# Patient Record
Sex: MALE | Race: WHITE | NOT HISPANIC OR LATINO | Employment: FULL TIME | URBAN - METROPOLITAN AREA
[De-identification: names, ages, dates, MRNs, and addresses within clinical notes are randomized per-mention and may not be internally consistent; named-entity substitution may affect disease eponyms.]

---

## 2023-04-19 LAB
ALANINE AMINOTRANSFERASE (SGPT) (U/L) IN SER/PLAS: 31 U/L (ref 10–52)
ALBUMIN (G/DL) IN SER/PLAS: 4.5 G/DL (ref 3.4–5)
ALKALINE PHOSPHATASE (U/L) IN SER/PLAS: 51 U/L (ref 33–120)
ANION GAP IN SER/PLAS: 10 MMOL/L (ref 10–20)
ASPARTATE AMINOTRANSFERASE (SGOT) (U/L) IN SER/PLAS: 17 U/L (ref 9–39)
BILIRUBIN TOTAL (MG/DL) IN SER/PLAS: 0.7 MG/DL (ref 0–1.2)
CALCIUM (MG/DL) IN SER/PLAS: 10.2 MG/DL (ref 8.6–10.3)
CARBON DIOXIDE, TOTAL (MMOL/L) IN SER/PLAS: 30 MMOL/L (ref 21–32)
CHLORIDE (MMOL/L) IN SER/PLAS: 104 MMOL/L (ref 98–107)
CHOLESTEROL (MG/DL) IN SER/PLAS: 137 MG/DL (ref 0–199)
CHOLESTEROL IN HDL (MG/DL) IN SER/PLAS: 58.2 MG/DL
CHOLESTEROL/HDL RATIO: 2.4
CREATININE (MG/DL) IN SER/PLAS: 1.17 MG/DL (ref 0.5–1.3)
GFR MALE: 73 ML/MIN/1.73M2
GLUCOSE (MG/DL) IN SER/PLAS: 99 MG/DL (ref 74–99)
LDL: 57 MG/DL (ref 0–99)
POTASSIUM (MMOL/L) IN SER/PLAS: 4.4 MMOL/L (ref 3.5–5.3)
PROSTATE SPECIFIC AG (NG/ML) IN SER/PLAS: 0.29 NG/ML (ref 0–4)
PROTEIN TOTAL: 7.3 G/DL (ref 6.4–8.2)
SODIUM (MMOL/L) IN SER/PLAS: 140 MMOL/L (ref 136–145)
TRIGLYCERIDE (MG/DL) IN SER/PLAS: 107 MG/DL (ref 0–149)
UREA NITROGEN (MG/DL) IN SER/PLAS: 18 MG/DL (ref 6–23)
VLDL: 21 MG/DL (ref 0–40)

## 2023-04-24 ENCOUNTER — OFFICE VISIT (OUTPATIENT)
Dept: PRIMARY CARE | Facility: CLINIC | Age: 58
End: 2023-04-24
Payer: COMMERCIAL

## 2023-04-24 VITALS
TEMPERATURE: 98.1 F | HEART RATE: 63 BPM | WEIGHT: 168 LBS | SYSTOLIC BLOOD PRESSURE: 146 MMHG | DIASTOLIC BLOOD PRESSURE: 92 MMHG | OXYGEN SATURATION: 100 %

## 2023-04-24 DIAGNOSIS — E78.2 MIXED HYPERLIPIDEMIA: Primary | ICD-10-CM

## 2023-04-24 DIAGNOSIS — Z72.0 TOBACCO ABUSE: ICD-10-CM

## 2023-04-24 DIAGNOSIS — J43.2 CENTRILOBULAR EMPHYSEMA (MULTI): ICD-10-CM

## 2023-04-24 DIAGNOSIS — I10 PRIMARY HYPERTENSION: ICD-10-CM

## 2023-04-24 PROCEDURE — 3077F SYST BP >= 140 MM HG: CPT | Performed by: FAMILY MEDICINE

## 2023-04-24 PROCEDURE — 99214 OFFICE O/P EST MOD 30 MIN: CPT | Performed by: FAMILY MEDICINE

## 2023-04-24 PROCEDURE — 3080F DIAST BP >= 90 MM HG: CPT | Performed by: FAMILY MEDICINE

## 2023-04-24 RX ORDER — CHLORTHALIDONE 25 MG/1
12.5 TABLET ORAL DAILY
COMMUNITY
End: 2023-04-24 | Stop reason: SDUPTHER

## 2023-04-24 RX ORDER — ATORVASTATIN CALCIUM 20 MG/1
20 TABLET, FILM COATED ORAL DAILY
COMMUNITY
Start: 2022-11-28 | End: 2023-04-24 | Stop reason: SDUPTHER

## 2023-04-24 RX ORDER — CHLORTHALIDONE 25 MG/1
12.5 TABLET ORAL DAILY
Qty: 90 TABLET | Refills: 1 | Status: SHIPPED | OUTPATIENT
Start: 2023-04-24 | End: 2023-07-17 | Stop reason: SDUPTHER

## 2023-04-24 RX ORDER — ATORVASTATIN CALCIUM 20 MG/1
20 TABLET, FILM COATED ORAL DAILY
Qty: 90 TABLET | Refills: 1 | Status: SHIPPED | OUTPATIENT
Start: 2023-04-24 | End: 2023-06-07 | Stop reason: SDUPTHER

## 2023-04-24 NOTE — PROGRESS NOTES
Subjective   Patient ID: Basim Wilkinson is a 57 y.o. male who presents for Hypertension (Recheck) and Hyperlipidemia (Review BW).  HPI    Current Outpatient Medications on File Prior to Visit   Medication Sig Dispense Refill    atorvastatin (Lipitor) 20 mg tablet Take 1 tablet (20 mg) by mouth once daily.      chlorthalidone (Hygroton) 25 mg tablet Take 0.5 tablets (12.5 mg) by mouth once daily.       No current facility-administered medications on file prior to visit.        Vitals:    04/24/23 1605   BP: (!) 146/92   Pulse: 63   Temp: 36.7 °C (98.1 °F)   SpO2: 100%       Review of Systems    Objective     Physical Exam    Orders Only on 04/19/2023   Component Date Value Ref Range Status    Cholesterol 04/19/2023 137  0 - 199 mg/dL Final    Comment: .      AGE      DESIRABLE   BORDERLINE HIGH   HIGH     0-19 Y     0 - 169       170 - 199     >/= 200    20-24 Y     0 - 189       190 - 224     >/= 225         >24 Y     0 - 199       200 - 239     >/= 240   **All ranges are based on fasting samples. Specific   therapeutic targets will vary based on patient-specific   cardiac risk.  .   Pediatric guidelines reference:Pediatrics 2011, 128(S5).   Adult guidelines reference: NCEP ATPIII Guidelines,     RIGOBERTO 2001, 258:2486-97  .   Venipuncture immediately after or during the    administration of Metamizole may lead to falsely   low results. Testing should be performed immediately   prior to Metamizole dosing.      HDL 04/19/2023 58.2  mg/dL Final    Comment: .      AGE      VERY LOW   LOW     NORMAL    HIGH       0-19 Y       < 35   < 40     40-45     ----    20-24 Y       ----   < 40       >45     ----      >24 Y       ----   < 40     40-60      >60  .      Cholesterol/HDL Ratio 04/19/2023 2.4   Final    Comment: REF VALUES  DESIRABLE  < 3.4  HIGH RISK  > 5.0      LDL 04/19/2023 57  0 - 99 mg/dL Final    Comment: .                           NEAR      BORD      AGE      DESIRABLE  OPTIMAL    HIGH     HIGH     VERY HIGH      0-19 Y     0 - 109     ---    110-129   >/= 130     ----    20-24 Y     0 - 119     ---    120-159   >/= 160     ----      >24 Y     0 -  99   100-129  130-159   160-189     >/=190  .      VLDL 04/19/2023 21  0 - 40 mg/dL Final    Triglycerides 04/19/2023 107  0 - 149 mg/dL Final    Comment: .      AGE      DESIRABLE   BORDERLINE HIGH   HIGH     VERY HIGH   0 D-90 D    19 - 174         ----         ----        ----  91 D- 9 Y     0 -  74        75 -  99     >/= 100      ----    10-19 Y     0 -  89        90 - 129     >/= 130      ----    20-24 Y     0 - 114       115 - 149     >/= 150      ----         >24 Y     0 - 149       150 - 199    200- 499    >/= 500  .   Venipuncture immediately after or during the    administration of Metamizole may lead to falsely   low results. Testing should be performed immediately   prior to Metamizole dosing.     Orders Only on 04/19/2023   Component Date Value Ref Range Status    Glucose 04/19/2023 99  74 - 99 mg/dL Final    Sodium 04/19/2023 140  136 - 145 mmol/L Final    Potassium 04/19/2023 4.4  3.5 - 5.3 mmol/L Final    Chloride 04/19/2023 104  98 - 107 mmol/L Final    Bicarbonate 04/19/2023 30  21 - 32 mmol/L Final    Anion Gap 04/19/2023 10  10 - 20 mmol/L Final    Urea Nitrogen 04/19/2023 18  6 - 23 mg/dL Final    Creatinine 04/19/2023 1.17  0.50 - 1.30 mg/dL Final    GFR MALE 04/19/2023 73  >90 mL/min/1.73m2 Final    Comment:  CALCULATIONS OF ESTIMATED GFR ARE PERFORMED   USING THE 2021 CKD-EPI STUDY REFIT EQUATION   WITHOUT THE RACE VARIABLE FOR THE IDMS-TRACEABLE   CREATININE METHODS.    https://jasn.asnjournals.org/content/early/2021/09/22/ASN.6872566508      Calcium 04/19/2023 10.2  8.6 - 10.3 mg/dL Final    Albumin 04/19/2023 4.5  3.4 - 5.0 g/dL Final    Alkaline Phosphatase 04/19/2023 51  33 - 120 U/L Final    Total Protein 04/19/2023 7.3  6.4 - 8.2 g/dL Final    AST 04/19/2023 17  9 - 39 U/L Final    Total Bilirubin 04/19/2023 0.7  0.0 - 1.2 mg/dL Final    ALT (SGPT)  04/19/2023 31  10 - 52 U/L Final    Comment:  Patients treated with Sulfasalazine may generate    falsely decreased results for ALT.     Orders Only on 04/19/2023   Component Date Value Ref Range Status    PSA 04/19/2023 0.29  0.00 - 4.00 ng/mL Final    Comment: The FDA requires that the method used for PSA assay be   reported to the physician. Values obtained with different   assay methods must not be used interchangeably. This test  was performed at [facility] using the Access   Hybritech PSA assay is a two-site immunoenzymatic sandwich   assay. The assay is approved for measurement of   prostate-specific antigen (PSA)in serum and may be used   in conjunction with a digital rectal examination in men   50 years and older as an aid in detection of prostate   cancer.  5-Alpha-reductase inhibitors (e.g. Proscar, Finasteride,   Avodart, Dutasteride and Dior) for the treatment of BPH   have been shown to lower PSA levels by an average of 50%   after 6 months of treatment.         Assessment/Plan   Problem List Items Addressed This Visit    None  Visit Diagnoses       Mixed hyperlipidemia    -  Primary    Relevant Medications    atorvastatin (Lipitor) 20 mg tablet    Other Relevant Orders    Comprehensive Metabolic Panel    Lipid Panel    CBC and Auto Differential    Primary hypertension        Relevant Medications    chlorthalidone (Hygroton) 25 mg tablet    Tobacco abuse        Centrilobular emphysema (CMS/HCC)            Encourage patient to quit smoking.  Talked him about his calcium scoring and lung cancer screening.  Follow-up in 6 months

## 2023-06-07 DIAGNOSIS — E78.2 MIXED HYPERLIPIDEMIA: ICD-10-CM

## 2023-06-07 RX ORDER — ATORVASTATIN CALCIUM 20 MG/1
20 TABLET, FILM COATED ORAL DAILY
Qty: 90 TABLET | Refills: 1 | Status: SHIPPED | OUTPATIENT
Start: 2023-06-07 | End: 2023-06-14 | Stop reason: SDUPTHER

## 2023-06-07 NOTE — TELEPHONE ENCOUNTER
Pt spouse called rx line yesterday @ 400 req refill on Atorvastatin needs sent to [facility]/ mk

## 2023-06-14 ENCOUNTER — TELEPHONE (OUTPATIENT)
Dept: PRIMARY CARE | Facility: CLINIC | Age: 58
End: 2023-06-14
Payer: COMMERCIAL

## 2023-06-14 DIAGNOSIS — E78.2 MIXED HYPERLIPIDEMIA: ICD-10-CM

## 2023-06-14 RX ORDER — ATORVASTATIN CALCIUM 20 MG/1
20 TABLET, FILM COATED ORAL DAILY
Qty: 90 TABLET | Refills: 1 | Status: SHIPPED | OUTPATIENT
Start: 2023-06-14 | End: 2023-10-25 | Stop reason: SDUPTHER

## 2023-07-17 DIAGNOSIS — I10 PRIMARY HYPERTENSION: ICD-10-CM

## 2023-07-17 RX ORDER — CHLORTHALIDONE 25 MG/1
12.5 TABLET ORAL DAILY
Qty: 90 TABLET | Refills: 1 | Status: SHIPPED | OUTPATIENT
Start: 2023-07-17 | End: 2023-10-25 | Stop reason: SDUPTHER

## 2023-07-17 NOTE — TELEPHONE ENCOUNTER
Pt called rx line @ 10:49am requesting RF on Chlorthalidone. [facility]    [facility] PT  Next OV 10/24/23  Med sent on 4/24/23 to [facility]  Pt uses [facility]  Ok to resend? Please advise./JW

## 2023-10-24 ENCOUNTER — APPOINTMENT (OUTPATIENT)
Dept: PRIMARY CARE | Facility: CLINIC | Age: 58
End: 2023-10-24
Payer: COMMERCIAL

## 2023-10-25 ENCOUNTER — OFFICE VISIT (OUTPATIENT)
Dept: PRIMARY CARE | Facility: CLINIC | Age: 58
End: 2023-10-25
Payer: COMMERCIAL

## 2023-10-25 VITALS
OXYGEN SATURATION: 97 % | DIASTOLIC BLOOD PRESSURE: 84 MMHG | TEMPERATURE: 97.8 F | HEART RATE: 95 BPM | HEIGHT: 67 IN | WEIGHT: 166 LBS | BODY MASS INDEX: 26.06 KG/M2 | SYSTOLIC BLOOD PRESSURE: 136 MMHG

## 2023-10-25 DIAGNOSIS — J43.2 CENTRILOBULAR EMPHYSEMA (MULTI): Primary | ICD-10-CM

## 2023-10-25 DIAGNOSIS — Z12.5 SCREENING FOR MALIGNANT NEOPLASM OF PROSTATE: ICD-10-CM

## 2023-10-25 DIAGNOSIS — E78.2 MIXED HYPERLIPIDEMIA: ICD-10-CM

## 2023-10-25 DIAGNOSIS — Z71.6 ENCOUNTER FOR TOBACCO USE CESSATION COUNSELING: ICD-10-CM

## 2023-10-25 DIAGNOSIS — Z00.00 ROUTINE ADULT HEALTH MAINTENANCE: ICD-10-CM

## 2023-10-25 DIAGNOSIS — I10 PRIMARY HYPERTENSION: ICD-10-CM

## 2023-10-25 PROCEDURE — 3075F SYST BP GE 130 - 139MM HG: CPT | Performed by: FAMILY MEDICINE

## 2023-10-25 PROCEDURE — 3079F DIAST BP 80-89 MM HG: CPT | Performed by: FAMILY MEDICINE

## 2023-10-25 PROCEDURE — 99396 PREV VISIT EST AGE 40-64: CPT | Performed by: FAMILY MEDICINE

## 2023-10-25 PROCEDURE — 99213 OFFICE O/P EST LOW 20 MIN: CPT | Performed by: FAMILY MEDICINE

## 2023-10-25 RX ORDER — ATORVASTATIN CALCIUM 20 MG/1
20 TABLET, FILM COATED ORAL DAILY
Qty: 90 TABLET | Refills: 1 | Status: SHIPPED | OUTPATIENT
Start: 2023-10-25 | End: 2024-04-17 | Stop reason: SDUPTHER

## 2023-10-25 RX ORDER — CHLORTHALIDONE 25 MG/1
12.5 TABLET ORAL DAILY
Qty: 90 TABLET | Refills: 1 | Status: SHIPPED | OUTPATIENT
Start: 2023-10-25 | End: 2024-02-19 | Stop reason: SDUPTHER

## 2023-10-25 ASSESSMENT — PATIENT HEALTH QUESTIONNAIRE - PHQ9
SUM OF ALL RESPONSES TO PHQ9 QUESTIONS 1 AND 2: 0
2. FEELING DOWN, DEPRESSED OR HOPELESS: NOT AT ALL
1. LITTLE INTEREST OR PLEASURE IN DOING THINGS: NOT AT ALL

## 2023-10-25 NOTE — PROGRESS NOTES
Subjective   Patient ID: Basim Wilkinson is a 57 y.o. male who presents for Annual Exam.  HPI    There is no problem list on file for this patient.      Social Connections: Not on file       Current Outpatient Medications on File Prior to Visit   Medication Sig Dispense Refill    atorvastatin (Lipitor) 20 mg tablet Take 1 tablet (20 mg) by mouth once daily. 90 tablet 1    chlorthalidone (Hygroton) 25 mg tablet Take 0.5 tablets (12.5 mg) by mouth once daily. 90 tablet 1     No current facility-administered medications on file prior to visit.        Vitals:    10/25/23 1343   BP: 136/84   Pulse: 95   Temp: 36.6 °C (97.8 °F)   SpO2: 97%     Vitals:    10/25/23 1343   Weight: 75.3 kg (166 lb)       Review of Systems   All other systems reviewed and are negative.      Objective     Physical Exam  Vitals reviewed.   Constitutional:       General: He is not in acute distress.     Appearance: Normal appearance. He is well-developed. He is not diaphoretic.   HENT:      Head: Normocephalic and atraumatic.      Right Ear: Tympanic membrane normal.      Left Ear: Tympanic membrane normal.      Nose: Nose normal.      Mouth/Throat:      Mouth: Mucous membranes are moist.   Eyes:      Pupils: Pupils are equal, round, and reactive to light.   Cardiovascular:      Rate and Rhythm: Normal rate and regular rhythm.      Heart sounds: Normal heart sounds. No murmur heard.     No friction rub. No gallop.   Pulmonary:      Effort: Pulmonary effort is normal.      Breath sounds: Normal breath sounds. No rales.   Abdominal:      General: Bowel sounds are normal.      Palpations: Abdomen is soft.      Tenderness: There is no abdominal tenderness.   Musculoskeletal:      Cervical back: Normal range of motion and neck supple.   Skin:     General: Skin is warm and dry.   Neurological:      Mental Status: He is alert.   Psychiatric:         Mood and Affect: Mood normal.         No visits with results within 2 Month(s) from this visit.   Latest  known visit with results is:   Orders Only on 04/19/2023   Component Date Value Ref Range Status    Cholesterol 04/19/2023 137  0 - 199 mg/dL Final    Comment: .      AGE      DESIRABLE   BORDERLINE HIGH   HIGH     0-19 Y     0 - 169       170 - 199     >/= 200    20-24 Y     0 - 189       190 - 224     >/= 225         >24 Y     0 - 199       200 - 239     >/= 240   **All ranges are based on fasting samples. Specific   therapeutic targets will vary based on patient-specific   cardiac risk.  .   Pediatric guidelines reference:Pediatrics 2011, 128(S5).   Adult guidelines reference: NCEP ATPIII Guidelines,     RIGOBERTO 2001, 258:9216-97  .   Venipuncture immediately after or during the    administration of Metamizole may lead to falsely   low results. Testing should be performed immediately   prior to Metamizole dosing.      HDL 04/19/2023 58.2  mg/dL Final    Comment: .      AGE      VERY LOW   LOW     NORMAL    HIGH       0-19 Y       < 35   < 40     40-45     ----    20-24 Y       ----   < 40       >45     ----      >24 Y       ----   < 40     40-60      >60  .      Cholesterol/HDL Ratio 04/19/2023 2.4   Final    Comment: REF VALUES  DESIRABLE  < 3.4  HIGH RISK  > 5.0      LDL 04/19/2023 57  0 - 99 mg/dL Final    Comment: .                           NEAR      BORD      AGE      DESIRABLE  OPTIMAL    HIGH     HIGH     VERY HIGH     0-19 Y     0 - 109     ---    110-129   >/= 130     ----    20-24 Y     0 - 119     ---    120-159   >/= 160     ----      >24 Y     0 -  99   100-129  130-159   160-189     >/=190  .      VLDL 04/19/2023 21  0 - 40 mg/dL Final    Triglycerides 04/19/2023 107  0 - 149 mg/dL Final    Comment: .      AGE      DESIRABLE   BORDERLINE HIGH   HIGH     VERY HIGH   0 D-90 D    19 - 174         ----         ----        ----  91 D- 9 Y     0 -  74        75 -  99     >/= 100      ----    10-19 Y     0 -  89        90 - 129     >/= 130      ----    20-24 Y     0 - 114       115 - 149     >/= 150       ----         >24 Y     0 - 149       150 - 199    200- 499    >/= 500  .   Venipuncture immediately after or during the    administration of Metamizole may lead to falsely   low results. Testing should be performed immediately   prior to Metamizole dosing.         Assessment/Plan   Problem List Items Addressed This Visit             ICD-10-CM    Centrilobular emphysema (CMS/HCC) - Primary J43.2     Other Visit Diagnoses         Codes    Mixed hyperlipidemia     E78.2    Primary hypertension     I10          Patient is doing well.  Refilled pts meds.  Follow up in 6 mo.  Due for scope in 2024.  Counseled on smoking cessation.

## 2023-11-07 ENCOUNTER — LAB (OUTPATIENT)
Dept: LAB | Facility: LAB | Age: 58
End: 2023-11-07
Payer: COMMERCIAL

## 2023-11-07 DIAGNOSIS — E78.2 MIXED HYPERLIPIDEMIA: ICD-10-CM

## 2023-11-07 DIAGNOSIS — Z12.5 SCREENING FOR MALIGNANT NEOPLASM OF PROSTATE: ICD-10-CM

## 2023-11-07 LAB
ALBUMIN SERPL BCP-MCNC: 4.8 G/DL (ref 3.4–5)
ALP SERPL-CCNC: 54 U/L (ref 33–120)
ALT SERPL W P-5'-P-CCNC: 38 U/L (ref 10–52)
ANION GAP SERPL CALC-SCNC: 13 MMOL/L (ref 10–20)
AST SERPL W P-5'-P-CCNC: 24 U/L (ref 9–39)
BASOPHILS # BLD AUTO: 0.17 X10*3/UL (ref 0–0.1)
BASOPHILS NFR BLD AUTO: 2.1 %
BILIRUB SERPL-MCNC: 1.1 MG/DL (ref 0–1.2)
BUN SERPL-MCNC: 13 MG/DL (ref 6–23)
CALCIUM SERPL-MCNC: 10.1 MG/DL (ref 8.6–10.3)
CHLORIDE SERPL-SCNC: 107 MMOL/L (ref 98–107)
CHOLEST SERPL-MCNC: 182 MG/DL (ref 0–199)
CHOLESTEROL/HDL RATIO: 2.7
CO2 SERPL-SCNC: 26 MMOL/L (ref 21–32)
CREAT SERPL-MCNC: 1.23 MG/DL (ref 0.5–1.3)
EOSINOPHIL # BLD AUTO: 0.2 X10*3/UL (ref 0–0.7)
EOSINOPHIL NFR BLD AUTO: 2.4 %
ERYTHROCYTE [DISTWIDTH] IN BLOOD BY AUTOMATED COUNT: 13.3 % (ref 11.5–14.5)
GFR SERPL CREATININE-BSD FRML MDRD: 68 ML/MIN/1.73M*2
GLUCOSE SERPL-MCNC: 103 MG/DL (ref 74–99)
HCT VFR BLD AUTO: 49.9 % (ref 41–52)
HDLC SERPL-MCNC: 68 MG/DL
HGB BLD-MCNC: 16.5 G/DL (ref 13.5–17.5)
IMM GRANULOCYTES # BLD AUTO: 0.06 X10*3/UL (ref 0–0.7)
IMM GRANULOCYTES NFR BLD AUTO: 0.7 % (ref 0–0.9)
LDLC SERPL CALC-MCNC: 91 MG/DL
LYMPHOCYTES # BLD AUTO: 2.74 X10*3/UL (ref 1.2–4.8)
LYMPHOCYTES NFR BLD AUTO: 33.1 %
MCH RBC QN AUTO: 31.3 PG (ref 26–34)
MCHC RBC AUTO-ENTMCNC: 33.1 G/DL (ref 32–36)
MCV RBC AUTO: 95 FL (ref 80–100)
MONOCYTES # BLD AUTO: 0.65 X10*3/UL (ref 0.1–1)
MONOCYTES NFR BLD AUTO: 7.9 %
NEUTROPHILS # BLD AUTO: 4.45 X10*3/UL (ref 1.2–7.7)
NEUTROPHILS NFR BLD AUTO: 53.8 %
NON HDL CHOLESTEROL: 114 MG/DL (ref 0–149)
NRBC BLD-RTO: 0 /100 WBCS (ref 0–0)
PLATELET # BLD AUTO: 309 X10*3/UL (ref 150–450)
POTASSIUM SERPL-SCNC: 4.9 MMOL/L (ref 3.5–5.3)
PROT SERPL-MCNC: 7.8 G/DL (ref 6.4–8.2)
PSA SERPL-MCNC: 0.53 NG/ML
RBC # BLD AUTO: 5.27 X10*6/UL (ref 4.5–5.9)
SODIUM SERPL-SCNC: 141 MMOL/L (ref 136–145)
TRIGL SERPL-MCNC: 117 MG/DL (ref 0–149)
VLDL: 23 MG/DL (ref 0–40)
WBC # BLD AUTO: 8.3 X10*3/UL (ref 4.4–11.3)

## 2023-11-07 PROCEDURE — 36415 COLL VENOUS BLD VENIPUNCTURE: CPT

## 2023-11-07 PROCEDURE — 80053 COMPREHEN METABOLIC PANEL: CPT

## 2023-11-07 PROCEDURE — 80061 LIPID PANEL: CPT

## 2023-11-07 PROCEDURE — 85025 COMPLETE CBC W/AUTO DIFF WBC: CPT

## 2023-11-07 PROCEDURE — 84153 ASSAY OF PSA TOTAL: CPT

## 2023-11-08 ENCOUNTER — TELEPHONE (OUTPATIENT)
Dept: PRIMARY CARE | Facility: CLINIC | Age: 58
End: 2023-11-08
Payer: COMMERCIAL

## 2023-11-08 NOTE — RESULT ENCOUNTER NOTE
BW looks good- except BS which is slightly high and kidney function which is slightly worse than average.  Looks like its been stable however.

## 2023-11-08 NOTE — TELEPHONE ENCOUNTER
----- Message from Sunny Martin MD sent at 11/7/2023  9:24 PM EST -----  BW looks good- except BS which is slightly high and kidney function which is slightly worse than average.  Looks like its been stable however.

## 2024-02-19 DIAGNOSIS — I10 PRIMARY HYPERTENSION: ICD-10-CM

## 2024-02-19 RX ORDER — CHLORTHALIDONE 25 MG/1
12.5 TABLET ORAL DAILY
Qty: 90 TABLET | Refills: 1 | Status: SHIPPED | OUTPATIENT
Start: 2024-02-19 | End: 2024-04-17 | Stop reason: SDUPTHER

## 2024-02-19 NOTE — TELEPHONE ENCOUNTER
Pt called rx line @ 10:51am requesting RF on Chlorthalidone. Please call pt once sent. [facility].    Next OV 4/25/24  Pt compliant  Please advise. Thanks. JW

## 2024-04-17 ENCOUNTER — OFFICE VISIT (OUTPATIENT)
Dept: PRIMARY CARE | Facility: CLINIC | Age: 59
End: 2024-04-17
Payer: COMMERCIAL

## 2024-04-17 VITALS
HEART RATE: 112 BPM | DIASTOLIC BLOOD PRESSURE: 90 MMHG | WEIGHT: 172 LBS | SYSTOLIC BLOOD PRESSURE: 140 MMHG | TEMPERATURE: 98.3 F | OXYGEN SATURATION: 97 % | BODY MASS INDEX: 26.94 KG/M2

## 2024-04-17 DIAGNOSIS — J43.2 CENTRILOBULAR EMPHYSEMA (MULTI): ICD-10-CM

## 2024-04-17 DIAGNOSIS — E78.2 MIXED HYPERLIPIDEMIA: ICD-10-CM

## 2024-04-17 DIAGNOSIS — Z12.11 SCREENING FOR MALIGNANT NEOPLASM OF COLON: Primary | ICD-10-CM

## 2024-04-17 DIAGNOSIS — Z72.0 TOBACCO ABUSE: ICD-10-CM

## 2024-04-17 DIAGNOSIS — I10 PRIMARY HYPERTENSION: ICD-10-CM

## 2024-04-17 PROCEDURE — 99214 OFFICE O/P EST MOD 30 MIN: CPT | Performed by: FAMILY MEDICINE

## 2024-04-17 PROCEDURE — 3080F DIAST BP >= 90 MM HG: CPT | Performed by: FAMILY MEDICINE

## 2024-04-17 PROCEDURE — 3077F SYST BP >= 140 MM HG: CPT | Performed by: FAMILY MEDICINE

## 2024-04-17 RX ORDER — VALSARTAN 160 MG/1
160 TABLET ORAL DAILY
Qty: 90 TABLET | Refills: 1 | Status: SHIPPED | OUTPATIENT
Start: 2024-04-17 | End: 2024-10-14

## 2024-04-17 RX ORDER — CHLORTHALIDONE 25 MG/1
12.5 TABLET ORAL DAILY
Qty: 90 TABLET | Refills: 1 | Status: SHIPPED | OUTPATIENT
Start: 2024-04-17

## 2024-04-17 RX ORDER — ATORVASTATIN CALCIUM 20 MG/1
20 TABLET, FILM COATED ORAL DAILY
Qty: 90 TABLET | Refills: 1 | Status: SHIPPED | OUTPATIENT
Start: 2024-04-17

## 2024-04-17 NOTE — PROGRESS NOTES
Subjective   Patient ID: Basim Wilkinson is a 58 y.o. male who presents for centrilobular emphysema and Hyperlipidemia (recheck).  HPI    HTN:  running high at home    2. CAD:  CA score 150 in 2022.    3. Lipids: taking lipitor    4.  Emphysema:  Doesn't feel SOB.  Not helped by inhaler.    Patient Active Problem List   Diagnosis    Centrilobular emphysema (Multi)       Social Connections: Not on file       Current Outpatient Medications on File Prior to Visit   Medication Sig Dispense Refill    [DISCONTINUED] atorvastatin (Lipitor) 20 mg tablet Take 1 tablet (20 mg) by mouth once daily. 90 tablet 1    [DISCONTINUED] chlorthalidone (Hygroton) 25 mg tablet Take 0.5 tablets (12.5 mg) by mouth once daily. 90 tablet 1     No current facility-administered medications on file prior to visit.        Vitals:    04/17/24 1421   BP: 140/90   Pulse: (!) 112   Temp: 36.8 °C (98.3 °F)   SpO2: 97%     Vitals:    04/17/24 1421   Weight: 78 kg (172 lb)       Review of Systems   All other systems reviewed and are negative.      Objective     Physical Exam  Vitals reviewed.   Constitutional:       General: He is not in acute distress.     Appearance: Normal appearance. He is well-developed. He is not diaphoretic.   HENT:      Head: Normocephalic and atraumatic.      Right Ear: Tympanic membrane normal.      Left Ear: Tympanic membrane normal.      Nose: Nose normal.      Mouth/Throat:      Mouth: Mucous membranes are moist.   Eyes:      Pupils: Pupils are equal, round, and reactive to light.   Cardiovascular:      Rate and Rhythm: Normal rate and regular rhythm.      Heart sounds: Normal heart sounds. No murmur heard.     No friction rub. No gallop.   Pulmonary:      Effort: Pulmonary effort is normal.      Breath sounds: Normal breath sounds. No rales.   Abdominal:      General: Bowel sounds are normal.      Palpations: Abdomen is soft.      Tenderness: There is no abdominal tenderness.   Musculoskeletal:      Cervical back: Normal  range of motion and neck supple.   Skin:     General: Skin is warm and dry.   Neurological:      Mental Status: He is alert.   Psychiatric:         Mood and Affect: Mood normal.         No visits with results within 2 Month(s) from this visit.   Latest known visit with results is:   Lab on 11/07/2023   Component Date Value Ref Range Status    WBC 11/07/2023 8.3  4.4 - 11.3 x10*3/uL Final    nRBC 11/07/2023 0.0  0.0 - 0.0 /100 WBCs Final    RBC 11/07/2023 5.27  4.50 - 5.90 x10*6/uL Final    Hemoglobin 11/07/2023 16.5  13.5 - 17.5 g/dL Final    Hematocrit 11/07/2023 49.9  41.0 - 52.0 % Final    MCV 11/07/2023 95  80 - 100 fL Final    MCH 11/07/2023 31.3  26.0 - 34.0 pg Final    MCHC 11/07/2023 33.1  32.0 - 36.0 g/dL Final    RDW 11/07/2023 13.3  11.5 - 14.5 % Final    Platelets 11/07/2023 309  150 - 450 x10*3/uL Final    Neutrophils % 11/07/2023 53.8  40.0 - 80.0 % Final    Immature Granulocytes %, Automated 11/07/2023 0.7  0.0 - 0.9 % Final    Immature Granulocyte Count (IG) includes promyelocytes, myelocytes and metamyelocytes but does not include bands. Percent differential counts (%) should be interpreted in the context of the absolute cell counts (cells/UL).    Lymphocytes % 11/07/2023 33.1  13.0 - 44.0 % Final    Monocytes % 11/07/2023 7.9  2.0 - 10.0 % Final    Eosinophils % 11/07/2023 2.4  0.0 - 6.0 % Final    Basophils % 11/07/2023 2.1  0.0 - 2.0 % Final    Neutrophils Absolute 11/07/2023 4.45  1.20 - 7.70 x10*3/uL Final    Percent differential counts (%) should be interpreted in the context of the absolute cell counts (cells/uL).    Immature Granulocytes Absolute, Au* 11/07/2023 0.06  0.00 - 0.70 x10*3/uL Final    Lymphocytes Absolute 11/07/2023 2.74  1.20 - 4.80 x10*3/uL Final    Monocytes Absolute 11/07/2023 0.65  0.10 - 1.00 x10*3/uL Final    Eosinophils Absolute 11/07/2023 0.20  0.00 - 0.70 x10*3/uL Final    Basophils Absolute 11/07/2023 0.17 (H)  0.00 - 0.10 x10*3/uL Final    Glucose 11/07/2023 103 (H)   74 - 99 mg/dL Final    Sodium 11/07/2023 141  136 - 145 mmol/L Final    Potassium 11/07/2023 4.9  3.5 - 5.3 mmol/L Final    Chloride 11/07/2023 107  98 - 107 mmol/L Final    Bicarbonate 11/07/2023 26  21 - 32 mmol/L Final    Anion Gap 11/07/2023 13  10 - 20 mmol/L Final    Urea Nitrogen 11/07/2023 13  6 - 23 mg/dL Final    Creatinine 11/07/2023 1.23  0.50 - 1.30 mg/dL Final    eGFR 11/07/2023 68  >60 mL/min/1.73m*2 Final    Calculations of estimated GFR are performed using the 2021 CKD-EPI Study Refit equation without the race variable for the IDMS-Traceable creatinine methods.  https://jasn.asnjournals.org/content/early/2021/09/22/ASN.5366503939    Calcium 11/07/2023 10.1  8.6 - 10.3 mg/dL Final    Albumin 11/07/2023 4.8  3.4 - 5.0 g/dL Final    Alkaline Phosphatase 11/07/2023 54  33 - 120 U/L Final    Total Protein 11/07/2023 7.8  6.4 - 8.2 g/dL Final    AST 11/07/2023 24  9 - 39 U/L Final    Bilirubin, Total 11/07/2023 1.1  0.0 - 1.2 mg/dL Final    ALT 11/07/2023 38  10 - 52 U/L Final    Patients treated with Sulfasalazine may generate falsely decreased results for ALT.    Cholesterol 11/07/2023 182  0 - 199 mg/dL Final          Age      Desirable   Borderline High   High     0-19 Y     0 - 169       170 - 199     >/= 200    20-24 Y     0 - 189       190 - 224     >/= 225         >24 Y     0 - 199       200 - 239     >/= 240   **All ranges are based on fasting samples. Specific   therapeutic targets will vary based on patient-specific   cardiac risk.    Pediatric guidelines reference:Pediatrics 2011, 128(S5).Adult guidelines reference: NCEP ATPIII Guidelines,RIGOBERTO 2001, 258:2486-97    Venipuncture immediately after or during the administration of Metamizole may lead to falsely low results. Testing should be performed immediately prior to Metamizole dosing.    HDL-Cholesterol 11/07/2023 68.0  mg/dL Final      Age       Very Low   Low     Normal    High    0-19 Y    < 35      < 40     40-45     ----  20-24 Y    ----      < 40      >45      ----        >24 Y      ----     < 40     40-60      >60      Cholesterol/HDL Ratio 11/07/2023 2.7   Final      Ref Values  Desirable  < 3.4  High Risk  > 5.0    LDL Calculated 11/07/2023 91  <=99 mg/dL Final                                Near   Borderline      AGE      Desirable  Optimal    High     High     Very High     0-19 Y     0 - 109     ---    110-129   >/= 130     ----    20-24 Y     0 - 119     ---    120-159   >/= 160     ----      >24 Y     0 -  99   100-129  130-159   160-189     >/=190      VLDL 11/07/2023 23  0 - 40 mg/dL Final    Triglycerides 11/07/2023 117  0 - 149 mg/dL Final       Age         Desirable   Borderline High   High     Very High   0 D-90 D    19 - 174         ----         ----        ----  91 D- 9 Y     0 -  74        75 -  99     >/= 100      ----    10-19 Y     0 -  89        90 - 129     >/= 130      ----    20-24 Y     0 - 114       115 - 149     >/= 150      ----         >24 Y     0 - 149       150 - 199    200- 499    >/= 500    Venipuncture immediately after or during the administration of Metamizole may lead to falsely low results. Testing should be performed immediately prior to Metamizole dosing.    Non HDL Cholesterol 11/07/2023 114  0 - 149 mg/dL Final          Age       Desirable   Borderline High   High     Very High     0-19 Y     0 - 119       120 - 144     >/= 145    >/= 160    20-24 Y     0 - 149       150 - 189     >/= 190      ----         >24 Y    30 mg/dL above LDL Cholesterol goal      Prostate Specific AG 11/07/2023 0.53  <=4.00 ng/mL Final       Assessment/Plan   Problem List Items Addressed This Visit             ICD-10-CM    Centrilobular emphysema (Multi) J43.2     Other Visit Diagnoses         Codes    Screening for malignant neoplasm of colon    -  Primary Z12.11    Relevant Orders    Colonoscopy Screening; Average Risk Patient    Mixed hyperlipidemia     E78.2    Relevant Medications    atorvastatin (Lipitor) 20 mg tablet    Other  Relevant Orders    Lipid Panel    Comprehensive Metabolic Panel    CBC and Auto Differential    Primary hypertension     I10    Relevant Medications    chlorthalidone (Hygroton) 25 mg tablet    valsartan (Diovan) 160 mg tablet    Tobacco abuse     Z72.0    Relevant Orders    CT lung screening low dose          Patient is doing well.  Refilled pts meds.  Follow up in 6 mo.  Adding on Valsartan.  Counseled on smoking cessation.

## 2024-04-23 ENCOUNTER — LAB (OUTPATIENT)
Dept: LAB | Facility: LAB | Age: 59
End: 2024-04-23
Payer: COMMERCIAL

## 2024-04-23 DIAGNOSIS — E78.2 MIXED HYPERLIPIDEMIA: ICD-10-CM

## 2024-04-23 LAB
ALBUMIN SERPL BCP-MCNC: 4.3 G/DL (ref 3.4–5)
ALP SERPL-CCNC: 52 U/L (ref 33–120)
ALT SERPL W P-5'-P-CCNC: 30 U/L (ref 10–52)
ANION GAP SERPL CALC-SCNC: 8 MMOL/L (ref 10–20)
AST SERPL W P-5'-P-CCNC: 17 U/L (ref 9–39)
BASOPHILS # BLD AUTO: 0.18 X10*3/UL (ref 0–0.1)
BASOPHILS NFR BLD AUTO: 1.9 %
BILIRUB SERPL-MCNC: 0.7 MG/DL (ref 0–1.2)
BUN SERPL-MCNC: 15 MG/DL (ref 6–23)
CALCIUM SERPL-MCNC: 9.7 MG/DL (ref 8.6–10.3)
CHLORIDE SERPL-SCNC: 105 MMOL/L (ref 98–107)
CHOLEST SERPL-MCNC: 136 MG/DL (ref 0–199)
CHOLESTEROL/HDL RATIO: 2.6
CO2 SERPL-SCNC: 30 MMOL/L (ref 21–32)
CREAT SERPL-MCNC: 1.14 MG/DL (ref 0.5–1.3)
EGFRCR SERPLBLD CKD-EPI 2021: 75 ML/MIN/1.73M*2
EOSINOPHIL # BLD AUTO: 0.24 X10*3/UL (ref 0–0.7)
EOSINOPHIL NFR BLD AUTO: 2.5 %
ERYTHROCYTE [DISTWIDTH] IN BLOOD BY AUTOMATED COUNT: 13.1 % (ref 11.5–14.5)
GLUCOSE SERPL-MCNC: 97 MG/DL (ref 74–99)
HCT VFR BLD AUTO: 48.3 % (ref 41–52)
HDLC SERPL-MCNC: 53.3 MG/DL
HGB BLD-MCNC: 16.5 G/DL (ref 13.5–17.5)
IMM GRANULOCYTES # BLD AUTO: 0.09 X10*3/UL (ref 0–0.7)
IMM GRANULOCYTES NFR BLD AUTO: 0.9 % (ref 0–0.9)
LDLC SERPL CALC-MCNC: 60 MG/DL
LYMPHOCYTES # BLD AUTO: 2.73 X10*3/UL (ref 1.2–4.8)
LYMPHOCYTES NFR BLD AUTO: 28.2 %
MCH RBC QN AUTO: 31.6 PG (ref 26–34)
MCHC RBC AUTO-ENTMCNC: 34.2 G/DL (ref 32–36)
MCV RBC AUTO: 93 FL (ref 80–100)
MONOCYTES # BLD AUTO: 0.83 X10*3/UL (ref 0.1–1)
MONOCYTES NFR BLD AUTO: 8.6 %
NEUTROPHILS # BLD AUTO: 5.61 X10*3/UL (ref 1.2–7.7)
NEUTROPHILS NFR BLD AUTO: 57.9 %
NON HDL CHOLESTEROL: 83 MG/DL (ref 0–149)
NRBC BLD-RTO: 0.2 /100 WBCS (ref 0–0)
PLATELET # BLD AUTO: 314 X10*3/UL (ref 150–450)
POTASSIUM SERPL-SCNC: 4.3 MMOL/L (ref 3.5–5.3)
PROT SERPL-MCNC: 7.1 G/DL (ref 6.4–8.2)
RBC # BLD AUTO: 5.22 X10*6/UL (ref 4.5–5.9)
SODIUM SERPL-SCNC: 139 MMOL/L (ref 136–145)
TRIGL SERPL-MCNC: 113 MG/DL (ref 0–149)
VLDL: 23 MG/DL (ref 0–40)
WBC # BLD AUTO: 9.7 X10*3/UL (ref 4.4–11.3)

## 2024-04-23 PROCEDURE — 80061 LIPID PANEL: CPT

## 2024-04-23 PROCEDURE — 80053 COMPREHEN METABOLIC PANEL: CPT

## 2024-04-23 PROCEDURE — 85025 COMPLETE CBC W/AUTO DIFF WBC: CPT

## 2024-04-23 PROCEDURE — 36415 COLL VENOUS BLD VENIPUNCTURE: CPT

## 2024-04-25 ENCOUNTER — APPOINTMENT (OUTPATIENT)
Dept: PRIMARY CARE | Facility: CLINIC | Age: 59
End: 2024-04-25
Payer: COMMERCIAL

## 2024-05-01 ENCOUNTER — HOSPITAL ENCOUNTER (OUTPATIENT)
Dept: RADIOLOGY | Facility: CLINIC | Age: 59
Discharge: HOME | End: 2024-05-01
Payer: COMMERCIAL

## 2024-05-01 DIAGNOSIS — Z72.0 TOBACCO ABUSE: ICD-10-CM

## 2024-05-01 PROCEDURE — 71271 CT THORAX LUNG CANCER SCR C-: CPT

## 2024-05-07 ENCOUNTER — TELEPHONE (OUTPATIENT)
Dept: PRIMARY CARE | Facility: CLINIC | Age: 59
End: 2024-05-07
Payer: COMMERCIAL

## 2024-05-07 NOTE — TELEPHONE ENCOUNTER
----- Message from Sunny Martin MD sent at 5/6/2024 10:03 PM EDT -----  CT scan is stable, no issues.  Will continue to monitor annually

## 2024-05-07 NOTE — PROGRESS NOTES
Subjective   Patient ID: Basim Wilkinson is a 58 y.o. male who presents for No chief complaint on file..  HPI    Patient Active Problem List   Diagnosis    Centrilobular emphysema (Multi)       Social Connections: Not on file       Current Outpatient Medications on File Prior to Visit   Medication Sig Dispense Refill    atorvastatin (Lipitor) 20 mg tablet Take 1 tablet (20 mg) by mouth once daily. 90 tablet 1    chlorthalidone (Hygroton) 25 mg tablet Take 0.5 tablets (12.5 mg) by mouth once daily. 90 tablet 1    valsartan (Diovan) 160 mg tablet Take 1 tablet (160 mg) by mouth once daily. 90 tablet 1     No current facility-administered medications on file prior to visit.        There were no vitals filed for this visit.  There were no vitals filed for this visit.    Review of Systems    Objective     Physical Exam    Lab on 04/23/2024   Component Date Value Ref Range Status    Glucose 04/23/2024 97  74 - 99 mg/dL Final    Sodium 04/23/2024 139  136 - 145 mmol/L Final    Potassium 04/23/2024 4.3  3.5 - 5.3 mmol/L Final    Chloride 04/23/2024 105  98 - 107 mmol/L Final    Bicarbonate 04/23/2024 30  21 - 32 mmol/L Final    Anion Gap 04/23/2024 8 (L)  10 - 20 mmol/L Final    Urea Nitrogen 04/23/2024 15  6 - 23 mg/dL Final    Creatinine 04/23/2024 1.14  0.50 - 1.30 mg/dL Final    eGFR 04/23/2024 75  >60 mL/min/1.73m*2 Final    Calculations of estimated GFR are performed using the 2021 CKD-EPI Study Refit equation without the race variable for the IDMS-Traceable creatinine methods.  https://jasn.asnjournals.org/content/early/2021/09/22/ASN.3233310239    Calcium 04/23/2024 9.7  8.6 - 10.3 mg/dL Final    Albumin 04/23/2024 4.3  3.4 - 5.0 g/dL Final    Alkaline Phosphatase 04/23/2024 52  33 - 120 U/L Final    Total Protein 04/23/2024 7.1  6.4 - 8.2 g/dL Final    AST 04/23/2024 17  9 - 39 U/L Final    Bilirubin, Total 04/23/2024 0.7  0.0 - 1.2 mg/dL Final    ALT 04/23/2024 30  10 - 52 U/L Final    Patients treated with  Sulfasalazine may generate falsely decreased results for ALT.    Cholesterol 04/23/2024 136  0 - 199 mg/dL Final          Age      Desirable   Borderline High   High     0-19 Y     0 - 169       170 - 199     >/= 200    20-24 Y     0 - 189       190 - 224     >/= 225         >24 Y     0 - 199       200 - 239     >/= 240   **All ranges are based on fasting samples. Specific   therapeutic targets will vary based on patient-specific   cardiac risk.    Pediatric guidelines reference:Pediatrics 2011, 128(S5).Adult guidelines reference: NCEP ATPIII Guidelines,RIGOBERTO 2001, 258:2486-97    Venipuncture immediately after or during the administration of Metamizole may lead to falsely low results. Testing should be performed immediately prior to Metamizole dosing.    HDL-Cholesterol 04/23/2024 53.3  mg/dL Final      Age       Very Low   Low     Normal    High    0-19 Y    < 35      < 40     40-45     ----  20-24 Y    ----     < 40      >45      ----        >24 Y      ----     < 40     40-60      >60      Cholesterol/HDL Ratio 04/23/2024 2.6   Final      Ref Values  Desirable  < 3.4  High Risk  > 5.0    LDL Calculated 04/23/2024 60  <=99 mg/dL Final                                Near   Borderline      AGE      Desirable  Optimal    High     High     Very High     0-19 Y     0 - 109     ---    110-129   >/= 130     ----    20-24 Y     0 - 119     ---    120-159   >/= 160     ----      >24 Y     0 -  99   100-129  130-159   160-189     >/=190      VLDL 04/23/2024 23  0 - 40 mg/dL Final    Triglycerides 04/23/2024 113  0 - 149 mg/dL Final       Age         Desirable   Borderline High   High     Very High   0 D-90 D    19 - 174         ----         ----        ----  91 D- 9 Y     0 -  74        75 -  99     >/= 100      ----    10-19 Y     0 -  89        90 - 129     >/= 130      ----    20-24 Y     0 - 114       115 - 149     >/= 150      ----         >24 Y     0 - 149       150 - 199    200- 499    >/= 500    Venipuncture  immediately after or during the administration of Metamizole may lead to falsely low results. Testing should be performed immediately prior to Metamizole dosing.    Non HDL Cholesterol 04/23/2024 83  0 - 149 mg/dL Final          Age       Desirable   Borderline High   High     Very High     0-19 Y     0 - 119       120 - 144     >/= 145    >/= 160    20-24 Y     0 - 149       150 - 189     >/= 190      ----         >24 Y    30 mg/dL above LDL Cholesterol goal      WBC 04/23/2024 9.7  4.4 - 11.3 x10*3/uL Final    nRBC 04/23/2024 0.2 (H)  0.0 - 0.0 /100 WBCs Final    RBC 04/23/2024 5.22  4.50 - 5.90 x10*6/uL Final    Hemoglobin 04/23/2024 16.5  13.5 - 17.5 g/dL Final    Hematocrit 04/23/2024 48.3  41.0 - 52.0 % Final    MCV 04/23/2024 93  80 - 100 fL Final    MCH 04/23/2024 31.6  26.0 - 34.0 pg Final    MCHC 04/23/2024 34.2  32.0 - 36.0 g/dL Final    RDW 04/23/2024 13.1  11.5 - 14.5 % Final    Platelets 04/23/2024 314  150 - 450 x10*3/uL Final    Neutrophils % 04/23/2024 57.9  40.0 - 80.0 % Final    Immature Granulocytes %, Automated 04/23/2024 0.9  0.0 - 0.9 % Final    Immature Granulocyte Count (IG) includes promyelocytes, myelocytes and metamyelocytes but does not include bands. Percent differential counts (%) should be interpreted in the context of the absolute cell counts (cells/UL).    Lymphocytes % 04/23/2024 28.2  13.0 - 44.0 % Final    Monocytes % 04/23/2024 8.6  2.0 - 10.0 % Final    Eosinophils % 04/23/2024 2.5  0.0 - 6.0 % Final    Basophils % 04/23/2024 1.9  0.0 - 2.0 % Final    Neutrophils Absolute 04/23/2024 5.61  1.20 - 7.70 x10*3/uL Final    Percent differential counts (%) should be interpreted in the context of the absolute cell counts (cells/uL).    Immature Granulocytes Absolute, Au* 04/23/2024 0.09  0.00 - 0.70 x10*3/uL Final    Lymphocytes Absolute 04/23/2024 2.73  1.20 - 4.80 x10*3/uL Final    Monocytes Absolute 04/23/2024 0.83  0.10 - 1.00 x10*3/uL Final    Eosinophils Absolute 04/23/2024 0.24   0.00 - 0.70 x10*3/uL Final    Basophils Absolute 04/23/2024 0.18 (H)  0.00 - 0.10 x10*3/uL Final       Assessment/Plan

## 2024-05-21 ENCOUNTER — OFFICE VISIT (OUTPATIENT)
Dept: PRIMARY CARE | Facility: CLINIC | Age: 59
End: 2024-05-21
Payer: COMMERCIAL

## 2024-05-21 VITALS
OXYGEN SATURATION: 97 % | TEMPERATURE: 98.2 F | SYSTOLIC BLOOD PRESSURE: 118 MMHG | DIASTOLIC BLOOD PRESSURE: 70 MMHG | HEART RATE: 88 BPM

## 2024-05-21 DIAGNOSIS — J40 BRONCHITIS: Primary | ICD-10-CM

## 2024-05-21 PROBLEM — I10 PRIMARY HYPERTENSION: Status: ACTIVE | Noted: 2022-10-31

## 2024-05-21 PROBLEM — I25.10 ATHEROSCLEROSIS OF CORONARY ARTERY WITHOUT ANGINA PECTORIS: Status: ACTIVE | Noted: 2022-10-31

## 2024-05-21 PROCEDURE — 99213 OFFICE O/P EST LOW 20 MIN: CPT | Performed by: FAMILY MEDICINE

## 2024-05-21 PROCEDURE — 3078F DIAST BP <80 MM HG: CPT | Performed by: FAMILY MEDICINE

## 2024-05-21 PROCEDURE — 3074F SYST BP LT 130 MM HG: CPT | Performed by: FAMILY MEDICINE

## 2024-05-21 RX ORDER — ALBUTEROL SULFATE 90 UG/1
2 AEROSOL, METERED RESPIRATORY (INHALATION) EVERY 4 HOURS PRN
Qty: 8.5 G | Refills: 5 | Status: SHIPPED | OUTPATIENT
Start: 2024-05-21 | End: 2025-05-21

## 2024-05-21 RX ORDER — PREDNISONE 20 MG/1
40 TABLET ORAL DAILY
Qty: 14 TABLET | Refills: 0 | Status: SHIPPED | OUTPATIENT
Start: 2024-05-21 | End: 2024-05-28

## 2024-05-21 RX ORDER — AZITHROMYCIN 250 MG/1
TABLET, FILM COATED ORAL DAILY
Qty: 6 TABLET | Refills: 0 | Status: SHIPPED | OUTPATIENT
Start: 2024-05-21 | End: 2024-05-26

## 2024-05-21 ASSESSMENT — ENCOUNTER SYMPTOMS
HEADACHES: 1
COUGH: 1

## 2024-05-21 NOTE — PROGRESS NOTES
Subjective   Patient ID: Basim Wilkinson is a 58 y.o. male who presents for Cough and Headache (X 1 week).  Cough  Associated symptoms include headaches.   Headache   Associated symptoms include coughing.     Cough, congestion x 1 week.   Paroxysms of coughing.   Sometimes hard to catch his breath.  Trying to cut down on his smoking.  No fevers.  Has been going on for a week now.    Patient Active Problem List   Diagnosis    Centrilobular emphysema (Multi)    Impaired fasting glucose    Mixed hyperlipidemia    Primary hypertension    Atherosclerosis of coronary artery without angina pectoris       Social Connections: Not on file       Current Outpatient Medications on File Prior to Visit   Medication Sig Dispense Refill    atorvastatin (Lipitor) 20 mg tablet Take 1 tablet (20 mg) by mouth once daily. 90 tablet 1    chlorthalidone (Hygroton) 25 mg tablet Take 0.5 tablets (12.5 mg) by mouth once daily. 90 tablet 1    valsartan (Diovan) 160 mg tablet Take 1 tablet (160 mg) by mouth once daily. 90 tablet 1     No current facility-administered medications on file prior to visit.        Vitals:    05/21/24 0930   BP: 118/70   Pulse: 88   Temp: 36.8 °C (98.2 °F)   SpO2: 97%     There were no vitals filed for this visit.    Review of Systems   Respiratory:  Positive for cough.    Neurological:  Positive for headaches.   All other systems reviewed and are negative.      Objective     Physical Exam  Vitals reviewed.   Constitutional:       General: He is not in acute distress.     Appearance: Normal appearance. He is well-developed. He is not diaphoretic.   HENT:      Head: Normocephalic and atraumatic.      Right Ear: Tympanic membrane normal.      Left Ear: Tympanic membrane normal.      Nose: Nose normal.      Mouth/Throat:      Mouth: Mucous membranes are moist.   Eyes:      Pupils: Pupils are equal, round, and reactive to light.   Cardiovascular:      Rate and Rhythm: Normal rate and regular rhythm.      Heart sounds:  Normal heart sounds. No murmur heard.     No friction rub. No gallop.   Pulmonary:      Effort: Pulmonary effort is normal.      Breath sounds: Wheezing and rhonchi present. No rales.   Abdominal:      General: Bowel sounds are normal.      Palpations: Abdomen is soft.      Tenderness: There is no abdominal tenderness.   Musculoskeletal:      Cervical back: Normal range of motion and neck supple.   Skin:     General: Skin is warm and dry.   Neurological:      Mental Status: He is alert.   Psychiatric:         Mood and Affect: Mood normal.         Lab on 04/23/2024   Component Date Value Ref Range Status    Glucose 04/23/2024 97  74 - 99 mg/dL Final    Sodium 04/23/2024 139  136 - 145 mmol/L Final    Potassium 04/23/2024 4.3  3.5 - 5.3 mmol/L Final    Chloride 04/23/2024 105  98 - 107 mmol/L Final    Bicarbonate 04/23/2024 30  21 - 32 mmol/L Final    Anion Gap 04/23/2024 8 (L)  10 - 20 mmol/L Final    Urea Nitrogen 04/23/2024 15  6 - 23 mg/dL Final    Creatinine 04/23/2024 1.14  0.50 - 1.30 mg/dL Final    eGFR 04/23/2024 75  >60 mL/min/1.73m*2 Final    Calculations of estimated GFR are performed using the 2021 CKD-EPI Study Refit equation without the race variable for the IDMS-Traceable creatinine methods.  https://jasn.asnjournals.org/content/early/2021/09/22/ASN.2732805673    Calcium 04/23/2024 9.7  8.6 - 10.3 mg/dL Final    Albumin 04/23/2024 4.3  3.4 - 5.0 g/dL Final    Alkaline Phosphatase 04/23/2024 52  33 - 120 U/L Final    Total Protein 04/23/2024 7.1  6.4 - 8.2 g/dL Final    AST 04/23/2024 17  9 - 39 U/L Final    Bilirubin, Total 04/23/2024 0.7  0.0 - 1.2 mg/dL Final    ALT 04/23/2024 30  10 - 52 U/L Final    Patients treated with Sulfasalazine may generate falsely decreased results for ALT.    Cholesterol 04/23/2024 136  0 - 199 mg/dL Final          Age      Desirable   Borderline High   High     0-19 Y     0 - 169       170 - 199     >/= 200    20-24 Y     0 - 189       190 - 224     >/= 225         >24 Y      0 - 199       200 - 239     >/= 240   **All ranges are based on fasting samples. Specific   therapeutic targets will vary based on patient-specific   cardiac risk.    Pediatric guidelines reference:Pediatrics 2011, 128(S5).Adult guidelines reference: NCEP ATPIII Guidelines,RIGOBERTO 2001, 258:2486-97    Venipuncture immediately after or during the administration of Metamizole may lead to falsely low results. Testing should be performed immediately prior to Metamizole dosing.    HDL-Cholesterol 04/23/2024 53.3  mg/dL Final      Age       Very Low   Low     Normal    High    0-19 Y    < 35      < 40     40-45     ----  20-24 Y    ----     < 40      >45      ----        >24 Y      ----     < 40     40-60      >60      Cholesterol/HDL Ratio 04/23/2024 2.6   Final      Ref Values  Desirable  < 3.4  High Risk  > 5.0    LDL Calculated 04/23/2024 60  <=99 mg/dL Final                                Near   Borderline      AGE      Desirable  Optimal    High     High     Very High     0-19 Y     0 - 109     ---    110-129   >/= 130     ----    20-24 Y     0 - 119     ---    120-159   >/= 160     ----      >24 Y     0 -  99   100-129  130-159   160-189     >/=190      VLDL 04/23/2024 23  0 - 40 mg/dL Final    Triglycerides 04/23/2024 113  0 - 149 mg/dL Final       Age         Desirable   Borderline High   High     Very High   0 D-90 D    19 - 174         ----         ----        ----  91 D- 9 Y     0 -  74        75 -  99     >/= 100      ----    10-19 Y     0 -  89        90 - 129     >/= 130      ----    20-24 Y     0 - 114       115 - 149     >/= 150      ----         >24 Y     0 - 149       150 - 199    200- 499    >/= 500    Venipuncture immediately after or during the administration of Metamizole may lead to falsely low results. Testing should be performed immediately prior to Metamizole dosing.    Non HDL Cholesterol 04/23/2024 83  0 - 149 mg/dL Final          Age       Desirable   Borderline High   High     Very High      0-19 Y     0 - 119       120 - 144     >/= 145    >/= 160    20-24 Y     0 - 149       150 - 189     >/= 190      ----         >24 Y    30 mg/dL above LDL Cholesterol goal      WBC 04/23/2024 9.7  4.4 - 11.3 x10*3/uL Final    nRBC 04/23/2024 0.2 (H)  0.0 - 0.0 /100 WBCs Final    RBC 04/23/2024 5.22  4.50 - 5.90 x10*6/uL Final    Hemoglobin 04/23/2024 16.5  13.5 - 17.5 g/dL Final    Hematocrit 04/23/2024 48.3  41.0 - 52.0 % Final    MCV 04/23/2024 93  80 - 100 fL Final    MCH 04/23/2024 31.6  26.0 - 34.0 pg Final    MCHC 04/23/2024 34.2  32.0 - 36.0 g/dL Final    RDW 04/23/2024 13.1  11.5 - 14.5 % Final    Platelets 04/23/2024 314  150 - 450 x10*3/uL Final    Neutrophils % 04/23/2024 57.9  40.0 - 80.0 % Final    Immature Granulocytes %, Automated 04/23/2024 0.9  0.0 - 0.9 % Final    Immature Granulocyte Count (IG) includes promyelocytes, myelocytes and metamyelocytes but does not include bands. Percent differential counts (%) should be interpreted in the context of the absolute cell counts (cells/UL).    Lymphocytes % 04/23/2024 28.2  13.0 - 44.0 % Final    Monocytes % 04/23/2024 8.6  2.0 - 10.0 % Final    Eosinophils % 04/23/2024 2.5  0.0 - 6.0 % Final    Basophils % 04/23/2024 1.9  0.0 - 2.0 % Final    Neutrophils Absolute 04/23/2024 5.61  1.20 - 7.70 x10*3/uL Final    Percent differential counts (%) should be interpreted in the context of the absolute cell counts (cells/uL).    Immature Granulocytes Absolute, Au* 04/23/2024 0.09  0.00 - 0.70 x10*3/uL Final    Lymphocytes Absolute 04/23/2024 2.73  1.20 - 4.80 x10*3/uL Final    Monocytes Absolute 04/23/2024 0.83  0.10 - 1.00 x10*3/uL Final    Eosinophils Absolute 04/23/2024 0.24  0.00 - 0.70 x10*3/uL Final    Basophils Absolute 04/23/2024 0.18 (H)  0.00 - 0.10 x10*3/uL Final       Assessment/Plan   Problem List Items Addressed This Visit    None  Visit Diagnoses         Codes    Bronchitis    -  Primary J40    Relevant Medications    predniSONE (Deltasone) 20 mg  tablet    azithromycin (Zithromax) 250 mg tablet    albuterol (ProAir HFA) 90 mcg/actuation inhaler          Azithro + prednisone and albuterol.  Try to quit smoking.

## 2024-07-24 DIAGNOSIS — I10 PRIMARY HYPERTENSION: ICD-10-CM

## 2024-07-24 RX ORDER — VALSARTAN 160 MG/1
160 TABLET ORAL DAILY
Qty: 90 TABLET | Refills: 1 | Status: SHIPPED | OUTPATIENT
Start: 2024-07-24 | End: 2025-01-20

## 2024-07-24 NOTE — TELEPHONE ENCOUNTER
"Pt called rx line at 952[facility] requesting a refill on pended med, [facility] not [facility] \"on [facility]\"    Next OV 10/21  Pt compliant  Last rx 4/17- 6 mos  [facility]  [facility] Thx  "

## 2024-08-12 ENCOUNTER — ANESTHESIA (OUTPATIENT)
Dept: GASTROENTEROLOGY | Facility: HOSPITAL | Age: 59
End: 2024-08-12
Payer: COMMERCIAL

## 2024-08-12 ENCOUNTER — ANESTHESIA EVENT (OUTPATIENT)
Dept: GASTROENTEROLOGY | Facility: HOSPITAL | Age: 59
End: 2024-08-12
Payer: COMMERCIAL

## 2024-08-12 ENCOUNTER — HOSPITAL ENCOUNTER (OUTPATIENT)
Dept: GASTROENTEROLOGY | Facility: HOSPITAL | Age: 59
Discharge: HOME | End: 2024-08-12
Payer: COMMERCIAL

## 2024-08-12 VITALS
WEIGHT: 172 LBS | BODY MASS INDEX: 27 KG/M2 | RESPIRATION RATE: 12 BRPM | SYSTOLIC BLOOD PRESSURE: 116 MMHG | DIASTOLIC BLOOD PRESSURE: 91 MMHG | HEIGHT: 67 IN | TEMPERATURE: 97.2 F | HEART RATE: 71 BPM | OXYGEN SATURATION: 98 %

## 2024-08-12 DIAGNOSIS — Z12.11 SCREEN FOR COLON CANCER: ICD-10-CM

## 2024-08-12 PROCEDURE — 2500000004 HC RX 250 GENERAL PHARMACY W/ HCPCS (ALT 636 FOR OP/ED): Performed by: NURSE ANESTHETIST, CERTIFIED REGISTERED

## 2024-08-12 PROCEDURE — 2500000004 HC RX 250 GENERAL PHARMACY W/ HCPCS (ALT 636 FOR OP/ED): Performed by: SURGERY

## 2024-08-12 PROCEDURE — 45380 COLONOSCOPY AND BIOPSY: CPT | Performed by: SURGERY

## 2024-08-12 PROCEDURE — 3700000001 HC GENERAL ANESTHESIA TIME - INITIAL BASE CHARGE

## 2024-08-12 PROCEDURE — 7100000010 HC PHASE TWO TIME - EACH INCREMENTAL 1 MINUTE

## 2024-08-12 PROCEDURE — 3700000002 HC GENERAL ANESTHESIA TIME - EACH INCREMENTAL 1 MINUTE

## 2024-08-12 PROCEDURE — 2500000005 HC RX 250 GENERAL PHARMACY W/O HCPCS: Performed by: NURSE ANESTHETIST, CERTIFIED REGISTERED

## 2024-08-12 PROCEDURE — 7100000009 HC PHASE TWO TIME - INITIAL BASE CHARGE

## 2024-08-12 RX ORDER — LIDOCAINE HYDROCHLORIDE 20 MG/ML
INJECTION, SOLUTION EPIDURAL; INFILTRATION; INTRACAUDAL; PERINEURAL AS NEEDED
Status: DISCONTINUED | OUTPATIENT
Start: 2024-08-12 | End: 2024-08-12

## 2024-08-12 RX ORDER — PROPOFOL 10 MG/ML
INJECTION, EMULSION INTRAVENOUS AS NEEDED
Status: DISCONTINUED | OUTPATIENT
Start: 2024-08-12 | End: 2024-08-12

## 2024-08-12 RX ORDER — SODIUM CHLORIDE 9 MG/ML
20 INJECTION, SOLUTION INTRAVENOUS CONTINUOUS
Status: DISCONTINUED | OUTPATIENT
Start: 2024-08-12 | End: 2024-08-13 | Stop reason: HOSPADM

## 2024-08-12 SDOH — HEALTH STABILITY: MENTAL HEALTH: CURRENT SMOKER: 1

## 2024-08-12 ASSESSMENT — PAIN SCALES - GENERAL
PAIN_LEVEL: 0
PAINLEVEL_OUTOF10: 0 - NO PAIN

## 2024-08-12 ASSESSMENT — PAIN - FUNCTIONAL ASSESSMENT: PAIN_FUNCTIONAL_ASSESSMENT: 0-10

## 2024-08-12 NOTE — DISCHARGE INSTRUCTIONS
Patient Instructions after a Colonoscopy      The anesthetics, sedatives or narcotics which were given to you today will be acting in your body for the next 24 hours, so you might feel a little sleepy or groggy.  This feeling should slowly wear off. Carefully read and follow the instructions.     You received sedation today:  - Do not drive or operate any machinery or power tools of any kind.   - No alcoholic beverages today, not even beer or wine.  - Do not make any important decisions or sign any legal documents.  - No over the counter medications that contain alcohol or that may cause drowsiness.  - Do not make any important decisions or sign any legal documents.  - Make sure you have someone with you for first 24 hours.    While it is common to experience mild to moderate abdominal distention, gas, or belching after your procedure, if any of these symptoms occur following discharge from the GI Lab or within one week of having your procedure, call the [facility] to be advised whether a visit to your nearest Urgent Care or Emergency Department is indicated.  Take this paper with you if you go.     - If you develop an allergic reaction to the medications that were given during your procedure such as difficulty breathing, rash, hives, severe nausea, vomiting or lightheadedness.  - If you experience chest pain, shortness of breath, severe abdominal pain, fevers and chills.  -If you develop signs and symptoms of bleeding such as blood in your spit, if your stools turn black, tarry, or bloody  - If you have not urinated within 8 hours following your procedure.  - If your IV site becomes painful, red, inflamed, or looks infected.    If you received a biopsy/polypectomy/sphincterotomy the following instructions apply below:    __ Do not use Aspirin containing products, non-steroidal medications or anti-coagulants for one week following your procedure. (Examples of these types of medications are: Advil,  Arthrotec, Aleve, Coumadin, Ecotrin, Heparin, Ibuprofen, Indocin, Motrin, Naprosyn, Nuprin, Plavix, Vioxx, and Voltarin, or their generic forms.  This list is not all-inclusive.  Check with your physician or pharmacist before resuming medications.)   __ Eat a soft diet today.  Avoid foods that are poorly digested for the next 24 hours.  These foods would include: nuts, beans, lettuce, red meats, and fried foods. Start with liquids and advance your diet as tolerated, gradually work up to eating solids.   __ Do not have a Barium Study or Enema for one week.    Your physician recommends the additional following instructions:    -You have a contact number available for emergencies. The signs and symptoms of potential delayed complications were discussed with you. You may return to normal activities tomorrow.  -Resume your previous diet.  -Continue your present medications.   -We are waiting for your pathology results.  -Your physician has recommended a repeat colonoscopy (date to be determined after pending pathology results are reviewed) for surveillance based on pathology results.  -The findings and recommendations have been discussed with you.  -The findings and recommendations were discussed with your family.  - Please see Medication Reconciliation Form for new medication/medications prescribed.       If you experience any problems or have any questions following discharge from the GI Lab, please call:        Nurse Signature                                                                        Date___________________                                                                            Patient/Responsible Party Signature                                        Date___________________

## 2024-08-12 NOTE — H&P
"History Of Present Illness  Basim Wilkinson is a 58 y.o. male presenting for screening colonoscopy.     Past Medical History  Past Medical History:   Diagnosis Date    Colon polyp     Hyperlipidemia     Hypertension     Smoker        Surgical History  Past Surgical History:   Procedure Laterality Date    HERNIA REPAIR      As a child    TONSILLECTOMY      VASECTOMY  2005        Social History  He reports that he has been smoking cigarettes. He started smoking about 39 years ago. He has a 19.8 pack-year smoking history. He has never used smokeless tobacco. He reports current alcohol use of about 12.0 standard drinks of alcohol per week. He reports that he does not use drugs.    Family History  Family History   Problem Relation Name Age of Onset    Heart disease Father      Heart attack Father          Allergies  Patient has no known allergies.    Review of Systems     Physical Exam  Eyes:      Extraocular Movements: Extraocular movements intact.      Pupils: Pupils are equal, round, and reactive to light.   Cardiovascular:      Rate and Rhythm: Normal rate and regular rhythm.   Pulmonary:      Effort: Pulmonary effort is normal.   Abdominal:      Palpations: Abdomen is soft.   Skin:     General: Skin is warm and dry.   Neurological:      Mental Status: He is alert.   Psychiatric:         Mood and Affect: Mood normal.         Behavior: Behavior normal.          Last Recorded Vitals  Blood pressure (!) 131/95, pulse 88, temperature 36.1 °C (97 °F), temperature source Temporal, resp. rate 20, height 1.702 m (5' 7\"), weight 78 kg (172 lb), SpO2 98%.    Relevant Results             Assessment/Plan   Active Problems:  There are no active Hospital Problems.      Pt here for colonoscopy       I spent 15 minutes in the professional and overall care of this patient.      Sugey Floyd MD    "

## 2024-08-12 NOTE — ANESTHESIA POSTPROCEDURE EVALUATION
Patient: Basim Wilkinson    Procedure Summary       Date: 08/12/24 Room / Location: [facility]    Anesthesia Start: 0923 Anesthesia Stop: 0952    Procedure: COLONOSCOPY Diagnosis: Screen for colon cancer    Scheduled Providers: Sugey Floyd MD Responsible Provider: AISLINN Luke    Anesthesia Type: MAC ASA Status: 2            Anesthesia Type: MAC    Vitals Value Taken Time   /82 08/12/24 0948   Temp 36.3 °C (97.4 °F) 08/12/24 0948   Pulse 75 08/12/24 0948   Resp 17 08/12/24 0948   SpO2 98 % 08/12/24 0948       Anesthesia Post Evaluation    Patient location during evaluation: PACU  Patient participation: complete - patient participated  Level of consciousness: awake and alert  Pain score: 0  Pain management: adequate  Airway patency: patent  Cardiovascular status: acceptable and hemodynamically stable  Respiratory status: acceptable, spontaneous ventilation and nasal cannula  Hydration status: acceptable  Postoperative Nausea and Vomiting: none        There were no known notable events for this encounter.

## 2024-08-12 NOTE — ANESTHESIA PREPROCEDURE EVALUATION
Patient: Basim Wilkinson    Procedure Information       Date/Time: 08/12/24 0945    Scheduled providers: Sugey Floyd MD    Procedure: COLONOSCOPY    Location: [facility]            Relevant Problems   Anesthesia (within normal limits)      Cardiac   (+) Atherosclerosis of coronary artery without angina pectoris   (+) Mixed hyperlipidemia   (+) Primary hypertension      Pulmonary   (+) Centrilobular emphysema (Multi)       Clinical information reviewed:   Tobacco  Allergies  Meds   Med Hx  Surg Hx   Fam Hx  Soc Hx        NPO Detail:  NPO/Void Status  Date of Last Liquid: 08/11/24  Time of Last Liquid: 2100  Date of Last Solid: 08/10/24  Time of Last Solid: 1800  Last Intake Type: Solid meal; Clear fluids  Time of Last Void: 0730         Physical Exam    Airway  Mallampati: II  TM distance: >3 FB  Neck ROM: full     Cardiovascular - normal exam     Dental - normal exam     Pulmonary - normal exam     Abdominal - normal exam             Anesthesia Plan    History of general anesthesia?: yes  History of complications of general anesthesia?: no    ASA 2     MAC     The patient is a current smoker.  Patient was previously instructed to abstain from smoking on day of procedure.  Patient smoked on day of procedure.    intravenous induction   Anesthetic plan and risks discussed with patient.    Plan discussed with CRNA.

## 2024-08-15 LAB
LABORATORY COMMENT REPORT: NORMAL
PATH REPORT.FINAL DX SPEC: NORMAL
PATH REPORT.GROSS SPEC: NORMAL
PATH REPORT.RELEVANT HX SPEC: NORMAL
PATH REPORT.TOTAL CANCER: NORMAL

## 2024-10-21 ENCOUNTER — APPOINTMENT (OUTPATIENT)
Dept: PRIMARY CARE | Facility: CLINIC | Age: 59
End: 2024-10-21
Payer: COMMERCIAL

## 2024-10-21 ENCOUNTER — TELEPHONE (OUTPATIENT)
Dept: PRIMARY CARE | Facility: CLINIC | Age: 59
End: 2024-10-21

## 2024-10-21 ENCOUNTER — LAB (OUTPATIENT)
Dept: LAB | Facility: LAB | Age: 59
End: 2024-10-21
Payer: COMMERCIAL

## 2024-10-21 VITALS
TEMPERATURE: 97.5 F | OXYGEN SATURATION: 99 % | SYSTOLIC BLOOD PRESSURE: 122 MMHG | HEART RATE: 92 BPM | WEIGHT: 172 LBS | BODY MASS INDEX: 26.94 KG/M2 | DIASTOLIC BLOOD PRESSURE: 74 MMHG

## 2024-10-21 DIAGNOSIS — N52.9 ERECTILE DYSFUNCTION, UNSPECIFIED ERECTILE DYSFUNCTION TYPE: ICD-10-CM

## 2024-10-21 DIAGNOSIS — J40 BRONCHITIS: ICD-10-CM

## 2024-10-21 DIAGNOSIS — J43.2 CENTRILOBULAR EMPHYSEMA (MULTI): ICD-10-CM

## 2024-10-21 DIAGNOSIS — I10 PRIMARY HYPERTENSION: ICD-10-CM

## 2024-10-21 DIAGNOSIS — Z72.0 TOBACCO ABUSE: Primary | ICD-10-CM

## 2024-10-21 DIAGNOSIS — E78.2 MIXED HYPERLIPIDEMIA: ICD-10-CM

## 2024-10-21 LAB
ALBUMIN SERPL BCP-MCNC: 4.6 G/DL (ref 3.4–5)
ALP SERPL-CCNC: 56 U/L (ref 33–120)
ALT SERPL W P-5'-P-CCNC: 29 U/L (ref 10–52)
ANION GAP SERPL CALC-SCNC: 12 MMOL/L (ref 10–20)
AST SERPL W P-5'-P-CCNC: 18 U/L (ref 9–39)
BILIRUB SERPL-MCNC: 1.1 MG/DL (ref 0–1.2)
BUN SERPL-MCNC: 19 MG/DL (ref 6–23)
CALCIUM SERPL-MCNC: 9.6 MG/DL (ref 8.6–10.3)
CHLORIDE SERPL-SCNC: 103 MMOL/L (ref 98–107)
CO2 SERPL-SCNC: 27 MMOL/L (ref 21–32)
CREAT SERPL-MCNC: 1.37 MG/DL (ref 0.5–1.3)
EGFRCR SERPLBLD CKD-EPI 2021: 60 ML/MIN/1.73M*2
GLUCOSE SERPL-MCNC: 119 MG/DL (ref 74–99)
POTASSIUM SERPL-SCNC: 4.2 MMOL/L (ref 3.5–5.3)
PROT SERPL-MCNC: 7.5 G/DL (ref 6.4–8.2)
SODIUM SERPL-SCNC: 138 MMOL/L (ref 136–145)

## 2024-10-21 PROCEDURE — 3078F DIAST BP <80 MM HG: CPT | Performed by: FAMILY MEDICINE

## 2024-10-21 PROCEDURE — 36415 COLL VENOUS BLD VENIPUNCTURE: CPT

## 2024-10-21 PROCEDURE — 80053 COMPREHEN METABOLIC PANEL: CPT

## 2024-10-21 PROCEDURE — 3074F SYST BP LT 130 MM HG: CPT | Performed by: FAMILY MEDICINE

## 2024-10-21 PROCEDURE — 99214 OFFICE O/P EST MOD 30 MIN: CPT | Performed by: FAMILY MEDICINE

## 2024-10-21 RX ORDER — IBUPROFEN 200 MG
1 TABLET ORAL EVERY 24 HOURS
Qty: 56 PATCH | Refills: 0 | Status: SHIPPED | OUTPATIENT
Start: 2024-10-21 | End: 2024-12-20

## 2024-10-21 RX ORDER — ATORVASTATIN CALCIUM 20 MG/1
20 TABLET, FILM COATED ORAL DAILY
Qty: 90 TABLET | Refills: 1 | Status: SHIPPED | OUTPATIENT
Start: 2024-10-21

## 2024-10-21 RX ORDER — CHLORTHALIDONE 25 MG/1
12.5 TABLET ORAL DAILY
Qty: 90 TABLET | Refills: 1 | Status: SHIPPED | OUTPATIENT
Start: 2024-10-21

## 2024-10-21 RX ORDER — TIOTROPIUM BROMIDE 18 UG/1
1 CAPSULE ORAL; RESPIRATORY (INHALATION)
Qty: 90 CAPSULE | Refills: 3 | Status: SHIPPED | OUTPATIENT
Start: 2024-10-21 | End: 2025-10-21

## 2024-10-21 RX ORDER — ALBUTEROL SULFATE 90 UG/1
2 INHALANT RESPIRATORY (INHALATION) EVERY 4 HOURS PRN
Qty: 8.5 G | Refills: 5 | Status: SHIPPED | OUTPATIENT
Start: 2024-10-21 | End: 2025-10-21

## 2024-10-21 RX ORDER — SILDENAFIL 100 MG/1
100 TABLET, FILM COATED ORAL DAILY PRN
Qty: 36 TABLET | Refills: 1 | Status: SHIPPED | OUTPATIENT
Start: 2024-10-21 | End: 2025-10-21

## 2024-10-21 RX ORDER — VALSARTAN 160 MG/1
160 TABLET ORAL DAILY
Qty: 90 TABLET | Refills: 1 | Status: SHIPPED | OUTPATIENT
Start: 2024-10-21 | End: 2025-04-19

## 2024-10-21 NOTE — TELEPHONE ENCOUNTER
----- Message from Sunny Martin sent at 10/21/2024  1:22 PM EDT -----  Pts kidney function looked worse on this BW.  Was he fasting?  Try to stay well hydrated.

## 2024-10-21 NOTE — TELEPHONE ENCOUNTER
Patient was informed of the providers message.  Patient states he had a cup of coffee with sugar in it. That's all.

## 2024-10-21 NOTE — TELEPHONE ENCOUNTER
Ok I'll not worry about the blood sugar but hopefully he can stay well hydrated.  Try to avoid taking ibuprofen, aleve and other NSAIDs.

## 2024-10-21 NOTE — PROGRESS NOTES
Subjective   Patient ID: Basim Wilkinson is a 58 y.o. male who presents for Hypertension (Recheck) and Hyperlipidemia.  HPI    HTN:  well controlled    2. CAD:  CA score 150 in 2022.    3. Lipids: taking lipitor    4.  Emphysema:  feeling winded when exerting himself    5. ED: would like to try viagra    Patient Active Problem List   Diagnosis    Centrilobular emphysema (Multi)    Impaired fasting glucose    Mixed hyperlipidemia    Primary hypertension    Atherosclerosis of coronary artery without angina pectoris       Social Connections: Not on file       Current Outpatient Medications on File Prior to Visit   Medication Sig Dispense Refill    [DISCONTINUED] albuterol (ProAir HFA) 90 mcg/actuation inhaler Inhale 2 puffs every 4 hours if needed for wheezing or shortness of breath. 8.5 g 5    [DISCONTINUED] atorvastatin (Lipitor) 20 mg tablet Take 1 tablet (20 mg) by mouth once daily. 90 tablet 1    [DISCONTINUED] chlorthalidone (Hygroton) 25 mg tablet Take 0.5 tablets (12.5 mg) by mouth once daily. 90 tablet 1    [DISCONTINUED] valsartan (Diovan) 160 mg tablet Take 1 tablet (160 mg) by mouth once daily. 90 tablet 1     No current facility-administered medications on file prior to visit.        Vitals:    10/21/24 0817   BP: 122/74   Pulse: 92   Temp: 36.4 °C (97.5 °F)   SpO2: 99%     Vitals:    10/21/24 0817   Weight: 78 kg (172 lb)       Review of Systems   All other systems reviewed and are negative.      Objective     Physical Exam  Vitals reviewed.   Constitutional:       General: He is not in acute distress.     Appearance: Normal appearance. He is well-developed. He is not diaphoretic.   HENT:      Head: Normocephalic and atraumatic.      Right Ear: Tympanic membrane normal.      Left Ear: Tympanic membrane normal.      Nose: Nose normal.      Mouth/Throat:      Mouth: Mucous membranes are moist.   Eyes:      Pupils: Pupils are equal, round, and reactive to light.   Cardiovascular:      Rate and Rhythm: Normal  rate and regular rhythm.      Heart sounds: Normal heart sounds. No murmur heard.     No friction rub. No gallop.   Pulmonary:      Effort: Pulmonary effort is normal.      Breath sounds: Normal breath sounds. No rales.   Abdominal:      General: Bowel sounds are normal.      Palpations: Abdomen is soft.      Tenderness: There is no abdominal tenderness.   Musculoskeletal:      Cervical back: Normal range of motion and neck supple.   Skin:     General: Skin is warm and dry.   Neurological:      Mental Status: He is alert.   Psychiatric:         Mood and Affect: Mood normal.         No visits with results within 2 Month(s) from this visit.   Latest known visit with results is:   Hospital Outpatient Visit on 08/12/2024   Component Date Value Ref Range Status    Case Report 08/12/2024    Final                    Value:Surgical Pathology                                Case: [id number]                                  Authorizing Provider:  Sugey Floyd MD        Collected:           08/12/2024 0942              Ordering Location:     [facility]    Received:            08/12/2024 1133                                     Building                                                                     Pathologist:           Vivian Lancaster MD                                                            Specimen:    RECTUM POLYPECTOMY                                                                         FINAL DIAGNOSIS 08/12/2024    Final                    Value: A.  Rectum, polypectomy:  Tubular adenoma.          08/12/2024    Final                    Value:By the signature on this report, the individual or group listed as making the Final Interpretation/Diagnosis certifies that they have reviewed this case.       Clinical History 08/12/2024    Final                    Value:Colon cancer screening Z12.11      Gross Description 08/12/2024    Final                    Value:A: Received in formalin, labeled with  "the patient's name and hospital number and \"rectum polyp\", are 2 fragments of tan, soft tissue aggregating to 0.5 x 0.3 x 0.3 cm. The specimen is submitted in toto in one cassette.  SMS         Assessment/Plan   Problem List Items Addressed This Visit             ICD-10-CM    Centrilobular emphysema (Multi) J43.2    Relevant Medications    tiotropium (Spiriva with HandiHaler) 18 mcg inhalation capsule    Mixed hyperlipidemia E78.2    Relevant Medications    atorvastatin (Lipitor) 20 mg tablet    Primary hypertension I10    Relevant Medications    chlorthalidone (Hygroton) 25 mg tablet    valsartan (Diovan) 160 mg tablet    Other Relevant Orders    Comprehensive Metabolic Panel     Other Visit Diagnoses         Codes    Tobacco abuse    -  Primary Z72.0    Relevant Medications    nicotine (Nicoderm CQ) 21 mg/24 hr patch    Bronchitis     J40    Relevant Medications    albuterol (ProAir HFA) 90 mcg/actuation inhaler    Erectile dysfunction, unspecified erectile dysfunction type     N52.9    Relevant Medications    sildenafil (Viagra) 100 mg tablet          Patient is doing well.  Refilled pts meds.  Follow up in 6 mo.  Adding Spitiva  Counseled on smoking cessation.  "

## 2025-04-18 ENCOUNTER — TELEPHONE (OUTPATIENT)
Dept: RADIOLOGY | Facility: HOSPITAL | Age: 60
End: 2025-04-18
Payer: COMMERCIAL

## 2025-04-18 NOTE — TELEPHONE ENCOUNTER
Spoke with patient who will discuss with PCP at follow up visit on 4-. Provided him with phone number to call and schedule.

## 2025-04-21 ENCOUNTER — APPOINTMENT (OUTPATIENT)
Dept: PRIMARY CARE | Facility: CLINIC | Age: 60
End: 2025-04-21
Payer: COMMERCIAL

## 2025-04-21 VITALS
OXYGEN SATURATION: 100 % | HEIGHT: 68 IN | HEART RATE: 91 BPM | BODY MASS INDEX: 26.37 KG/M2 | DIASTOLIC BLOOD PRESSURE: 80 MMHG | SYSTOLIC BLOOD PRESSURE: 124 MMHG | TEMPERATURE: 97.6 F | WEIGHT: 174 LBS

## 2025-04-21 DIAGNOSIS — E78.2 MIXED HYPERLIPIDEMIA: ICD-10-CM

## 2025-04-21 DIAGNOSIS — Z72.0 TOBACCO ABUSE: ICD-10-CM

## 2025-04-21 DIAGNOSIS — Z71.6 ENCOUNTER FOR TOBACCO USE CESSATION COUNSELING: ICD-10-CM

## 2025-04-21 DIAGNOSIS — Z00.00 ROUTINE ADULT HEALTH MAINTENANCE: Primary | ICD-10-CM

## 2025-04-21 DIAGNOSIS — I10 PRIMARY HYPERTENSION: ICD-10-CM

## 2025-04-21 PROCEDURE — 3008F BODY MASS INDEX DOCD: CPT | Performed by: FAMILY MEDICINE

## 2025-04-21 PROCEDURE — 3079F DIAST BP 80-89 MM HG: CPT | Performed by: FAMILY MEDICINE

## 2025-04-21 PROCEDURE — 99214 OFFICE O/P EST MOD 30 MIN: CPT | Performed by: FAMILY MEDICINE

## 2025-04-21 PROCEDURE — 99396 PREV VISIT EST AGE 40-64: CPT | Performed by: FAMILY MEDICINE

## 2025-04-21 PROCEDURE — 3074F SYST BP LT 130 MM HG: CPT | Performed by: FAMILY MEDICINE

## 2025-04-21 RX ORDER — CHLORTHALIDONE 25 MG/1
12.5 TABLET ORAL DAILY
Qty: 90 TABLET | Refills: 1 | Status: SHIPPED | OUTPATIENT
Start: 2025-04-21

## 2025-04-21 RX ORDER — ATORVASTATIN CALCIUM 20 MG/1
20 TABLET, FILM COATED ORAL DAILY
Qty: 90 TABLET | Refills: 3 | Status: SHIPPED | OUTPATIENT
Start: 2025-04-21

## 2025-04-21 RX ORDER — BUPROPION HYDROCHLORIDE 150 MG/1
150 TABLET ORAL EVERY MORNING
Qty: 90 TABLET | Refills: 1 | Status: SHIPPED | OUTPATIENT
Start: 2025-04-21 | End: 2025-10-18

## 2025-04-21 RX ORDER — VALSARTAN 160 MG/1
160 TABLET ORAL DAILY
Qty: 90 TABLET | Refills: 1 | Status: SHIPPED | OUTPATIENT
Start: 2025-04-21 | End: 2025-10-18

## 2025-04-21 RX ORDER — ATORVASTATIN CALCIUM 20 MG/1
20 TABLET, FILM COATED ORAL DAILY
Qty: 90 TABLET | Refills: 1 | Status: SHIPPED | OUTPATIENT
Start: 2025-04-21

## 2025-04-21 ASSESSMENT — PATIENT HEALTH QUESTIONNAIRE - PHQ9
SUM OF ALL RESPONSES TO PHQ9 QUESTIONS 1 AND 2: 0
1. LITTLE INTEREST OR PLEASURE IN DOING THINGS: NOT AT ALL
2. FEELING DOWN, DEPRESSED OR HOPELESS: NOT AT ALL

## 2025-04-21 NOTE — PROGRESS NOTES
Subjective   Patient ID: Basim Wilkinson is a 59 y.o. male who presents for Hypertension and Hyperlipidemia.  HPI    HTN:  well controlled    2. CAD:  CA score 150 in 2022.    3. Lipids: taking lipitor    4.  Emphysema:  feeling winded when exerting himself    5. ED: would like to try viagra    Patient Active Problem List   Diagnosis    Centrilobular emphysema (Multi)    Impaired fasting glucose    Mixed hyperlipidemia    Primary hypertension    Atherosclerosis of coronary artery without angina pectoris       Social Connections: Not on file       Current Outpatient Medications on File Prior to Visit   Medication Sig Dispense Refill    albuterol (ProAir HFA) 90 mcg/actuation inhaler Inhale 2 puffs every 4 hours if needed for wheezing or shortness of breath. 8.5 g 5    sildenafil (Viagra) 100 mg tablet Take 1 tablet (100 mg) by mouth once daily as needed for erectile dysfunction. 36 tablet 1    tiotropium (Spiriva with HandiHaler) 18 mcg inhalation capsule Place 1 capsule (18 mcg) into inhaler and inhale once daily. 90 capsule 3    [DISCONTINUED] atorvastatin (Lipitor) 20 mg tablet Take 1 tablet (20 mg) by mouth once daily. 90 tablet 1    [DISCONTINUED] chlorthalidone (Hygroton) 25 mg tablet Take 0.5 tablets (12.5 mg) by mouth once daily. 90 tablet 1    [DISCONTINUED] valsartan (Diovan) 160 mg tablet Take 1 tablet (160 mg) by mouth once daily. 90 tablet 1    nicotine (Nicoderm CQ) 21 mg/24 hr patch Place 1 patch over 24 hours on the skin once every 24 hours. 56 patch 0     No current facility-administered medications on file prior to visit.        Vitals:    04/21/25 0815   BP: 124/80   Pulse: 91   Temp: 36.4 °C (97.6 °F)   SpO2: 100%     Vitals:    04/21/25 0815   Weight: 78.9 kg (174 lb)       Review of Systems   All other systems reviewed and are negative.      Objective     Physical Exam  Vitals reviewed.   Constitutional:       General: He is not in acute distress.     Appearance: Normal appearance. He is  well-developed. He is not diaphoretic.   HENT:      Head: Normocephalic and atraumatic.      Right Ear: Tympanic membrane normal.      Left Ear: Tympanic membrane normal.      Nose: Nose normal.      Mouth/Throat:      Mouth: Mucous membranes are moist.   Eyes:      Pupils: Pupils are equal, round, and reactive to light.   Cardiovascular:      Rate and Rhythm: Normal rate and regular rhythm.      Heart sounds: Normal heart sounds. No murmur heard.     No friction rub. No gallop.   Pulmonary:      Effort: Pulmonary effort is normal.      Breath sounds: Normal breath sounds. No rales.   Abdominal:      General: Bowel sounds are normal.      Palpations: Abdomen is soft.      Tenderness: There is no abdominal tenderness.   Musculoskeletal:      Cervical back: Normal range of motion and neck supple.   Skin:     General: Skin is warm and dry.   Neurological:      Mental Status: He is alert.   Psychiatric:         Mood and Affect: Mood normal.         No visits with results within 2 Month(s) from this visit.   Latest known visit with results is:   Lab on 10/21/2024   Component Date Value Ref Range Status    Glucose 10/21/2024 119 (H)  74 - 99 mg/dL Final    Sodium 10/21/2024 138  136 - 145 mmol/L Final    Potassium 10/21/2024 4.2  3.5 - 5.3 mmol/L Final    Chloride 10/21/2024 103  98 - 107 mmol/L Final    Bicarbonate 10/21/2024 27  21 - 32 mmol/L Final    Anion Gap 10/21/2024 12  10 - 20 mmol/L Final    Urea Nitrogen 10/21/2024 19  6 - 23 mg/dL Final    Creatinine 10/21/2024 1.37 (H)  0.50 - 1.30 mg/dL Final    eGFR 10/21/2024 60 (L)  >60 mL/min/1.73m*2 Final    Calculations of estimated GFR are performed using the 2021 CKD-EPI Study Refit equation without the race variable for the IDMS-Traceable creatinine methods.  https://jasn.asnjournals.org/content/early/2021/09/22/ASN.1066080884    Calcium 10/21/2024 9.6  8.6 - 10.3 mg/dL Final    Albumin 10/21/2024 4.6  3.4 - 5.0 g/dL Final    Alkaline Phosphatase 10/21/2024 56  33  - 120 U/L Final    Total Protein 10/21/2024 7.5  6.4 - 8.2 g/dL Final    AST 10/21/2024 18  9 - 39 U/L Final    Bilirubin, Total 10/21/2024 1.1  0.0 - 1.2 mg/dL Final    ALT 10/21/2024 29  10 - 52 U/L Final    Patients treated with Sulfasalazine may generate falsely decreased results for ALT.       Assessment/Plan   Problem List Items Addressed This Visit           ICD-10-CM    Mixed hyperlipidemia E78.2    Relevant Medications    atorvastatin (Lipitor) 20 mg tablet    Other Relevant Orders    Lipid Panel    Comprehensive Metabolic Panel    CBC and Auto Differential    Prostate Specific Antigen    Primary hypertension I10    Relevant Medications    chlorthalidone (Hygroton) 25 mg tablet    valsartan (Diovan) 160 mg tablet    Other Relevant Orders    Lipid Panel    Comprehensive Metabolic Panel    CBC and Auto Differential    Prostate Specific Antigen     Other Visit Diagnoses         Codes      Routine adult health maintenance    -  Primary Z00.00    Relevant Orders    Lipid Panel    Comprehensive Metabolic Panel    CBC and Auto Differential    Prostate Specific Antigen      Encounter for tobacco use cessation counseling     Z71.6    Relevant Orders    Lipid Panel    Comprehensive Metabolic Panel    CBC and Auto Differential    Prostate Specific Antigen          Patient is doing well.  Refilled pts meds.  Follow up in 6 mo.  Counseled on smoking cessation.  Will try Wellbutrin again.

## 2025-04-26 LAB
ALBUMIN SERPL-MCNC: 4.7 G/DL (ref 3.6–5.1)
ALP SERPL-CCNC: 56 U/L (ref 35–144)
ALT SERPL-CCNC: 30 U/L (ref 9–46)
ANION GAP SERPL CALCULATED.4IONS-SCNC: 7 MMOL/L (CALC) (ref 7–17)
AST SERPL-CCNC: 22 U/L (ref 10–35)
BASOPHILS # BLD AUTO: 118 CELLS/UL (ref 0–200)
BASOPHILS NFR BLD AUTO: 1.4 %
BILIRUB SERPL-MCNC: 0.9 MG/DL (ref 0.2–1.2)
BUN SERPL-MCNC: 25 MG/DL (ref 7–25)
CALCIUM SERPL-MCNC: 9.8 MG/DL (ref 8.6–10.3)
CHLORIDE SERPL-SCNC: 103 MMOL/L (ref 98–110)
CHOLEST SERPL-MCNC: 140 MG/DL
CHOLEST/HDLC SERPL: 2.3 (CALC)
CO2 SERPL-SCNC: 25 MMOL/L (ref 20–32)
CREAT SERPL-MCNC: 1.51 MG/DL (ref 0.7–1.3)
EGFRCR SERPLBLD CKD-EPI 2021: 53 ML/MIN/1.73M2
EOSINOPHIL # BLD AUTO: 210 CELLS/UL (ref 15–500)
EOSINOPHIL NFR BLD AUTO: 2.5 %
ERYTHROCYTE [DISTWIDTH] IN BLOOD BY AUTOMATED COUNT: 12.5 % (ref 11–15)
GLUCOSE SERPL-MCNC: 106 MG/DL (ref 65–99)
HCT VFR BLD AUTO: 43.8 % (ref 38.5–50)
HDLC SERPL-MCNC: 61 MG/DL
HGB BLD-MCNC: 14.8 G/DL (ref 13.2–17.1)
LDLC SERPL CALC-MCNC: 62 MG/DL (CALC)
LYMPHOCYTES # BLD AUTO: 2142 CELLS/UL (ref 850–3900)
LYMPHOCYTES NFR BLD AUTO: 25.5 %
MCH RBC QN AUTO: 30.8 PG (ref 27–33)
MCHC RBC AUTO-ENTMCNC: 33.8 G/DL (ref 32–36)
MCV RBC AUTO: 91.3 FL (ref 80–100)
MONOCYTES # BLD AUTO: 622 CELLS/UL (ref 200–950)
MONOCYTES NFR BLD AUTO: 7.4 %
NEUTROPHILS # BLD AUTO: 5309 CELLS/UL (ref 1500–7800)
NEUTROPHILS NFR BLD AUTO: 63.2 %
NONHDLC SERPL-MCNC: 79 MG/DL (CALC)
PLATELET # BLD AUTO: 333 THOUSAND/UL (ref 140–400)
PMV BLD REES-ECKER: 9.8 FL (ref 7.5–12.5)
POTASSIUM SERPL-SCNC: 4.3 MMOL/L (ref 3.5–5.3)
PROT SERPL-MCNC: 7.2 G/DL (ref 6.1–8.1)
PSA SERPL-MCNC: 0.51 NG/ML
RBC # BLD AUTO: 4.8 MILLION/UL (ref 4.2–5.8)
SODIUM SERPL-SCNC: 135 MMOL/L (ref 135–146)
TRIGL SERPL-MCNC: 86 MG/DL
WBC # BLD AUTO: 8.4 THOUSAND/UL (ref 3.8–10.8)

## 2025-04-26 NOTE — RESULT ENCOUNTER NOTE
Please let patient know that his blood work looked overall okay.  Blood sugars were slightly high.  Kidney function was worse than last time.  If he is taking any kinds of anti-inflammatories, he should hold off on taking them.  We will continue to monitor but if it keeps getting worse we may have to have him see the kidney doctor.  Try to stay well-hydrated.

## 2025-04-28 ENCOUNTER — TELEPHONE (OUTPATIENT)
Dept: PRIMARY CARE | Facility: CLINIC | Age: 60
End: 2025-04-28
Payer: COMMERCIAL

## 2025-04-28 NOTE — TELEPHONE ENCOUNTER
----- Message from Sunny Martin sent at 4/26/2025 12:07 PM EDT -----  Please let patient know that his blood work looked overall okay.  Blood sugars were slightly high.  Kidney function was worse than last time.  If he is taking any kinds of anti-inflammatories, he   should hold off on taking them.  We will continue to monitor but if it keeps getting worse we may have to have him see the kidney doctor.  Try to stay well-hydrated.  ----- Message -----  From: [facility] [employer] Results In  Sent: 4/26/2025   5:54 AM EDT  To: Sunny Martin MD

## 2025-04-29 ENCOUNTER — TELEPHONE (OUTPATIENT)
Dept: RADIOLOGY | Facility: HOSPITAL | Age: 60
End: 2025-04-29
Payer: COMMERCIAL

## 2025-04-29 NOTE — TELEPHONE ENCOUNTER
VM message left requesting patient to call and schedule the follow up Lung Cancer Screening exam. Requested they schedule with radiology @ [phone] .Encouraged  to return my call @  [phone] for any additional assistance.

## 2025-10-21 ENCOUNTER — APPOINTMENT (OUTPATIENT)
Dept: PRIMARY CARE | Facility: CLINIC | Age: 60
End: 2025-10-21
Payer: COMMERCIAL